# Patient Record
Sex: FEMALE | Race: ASIAN | NOT HISPANIC OR LATINO
[De-identification: names, ages, dates, MRNs, and addresses within clinical notes are randomized per-mention and may not be internally consistent; named-entity substitution may affect disease eponyms.]

---

## 2022-09-07 ENCOUNTER — ASOB RESULT (OUTPATIENT)
Age: 32
End: 2022-09-07

## 2022-09-07 ENCOUNTER — TRANSCRIPTION ENCOUNTER (OUTPATIENT)
Age: 32
End: 2022-09-07

## 2022-09-07 ENCOUNTER — APPOINTMENT (OUTPATIENT)
Dept: ANTEPARTUM | Facility: CLINIC | Age: 32
End: 2022-09-07

## 2022-09-07 PROBLEM — Z00.00 ENCOUNTER FOR PREVENTIVE HEALTH EXAMINATION: Status: ACTIVE | Noted: 2022-09-07

## 2022-09-07 PROCEDURE — 76817 TRANSVAGINAL US OBSTETRIC: CPT

## 2022-09-07 PROCEDURE — 76805 OB US >/= 14 WKS SNGL FETUS: CPT

## 2022-12-21 ENCOUNTER — NON-APPOINTMENT (OUTPATIENT)
Age: 32
End: 2022-12-21

## 2022-12-29 ENCOUNTER — APPOINTMENT (OUTPATIENT)
Dept: ANTEPARTUM | Facility: CLINIC | Age: 32
End: 2022-12-29
Payer: COMMERCIAL

## 2022-12-29 ENCOUNTER — ASOB RESULT (OUTPATIENT)
Age: 32
End: 2022-12-29

## 2022-12-29 PROCEDURE — 76816 OB US FOLLOW-UP PER FETUS: CPT

## 2022-12-29 PROCEDURE — 76819 FETAL BIOPHYS PROFIL W/O NST: CPT

## 2023-01-16 ENCOUNTER — TRANSCRIPTION ENCOUNTER (OUTPATIENT)
Age: 33
End: 2023-01-16

## 2023-01-16 ENCOUNTER — INPATIENT (INPATIENT)
Facility: HOSPITAL | Age: 33
LOS: 6 days | Discharge: HOME | End: 2023-01-23
Attending: OBSTETRICS & GYNECOLOGY | Admitting: OBSTETRICS & GYNECOLOGY
Payer: COMMERCIAL

## 2023-01-16 VITALS — HEIGHT: 61 IN | WEIGHT: 145.06 LBS

## 2023-01-16 LAB
BASOPHILS # BLD AUTO: 0.03 K/UL — SIGNIFICANT CHANGE UP (ref 0–0.2)
BASOPHILS NFR BLD AUTO: 0.4 % — SIGNIFICANT CHANGE UP (ref 0–2)
BLD GP AB SCN SERPL QL: NEGATIVE — SIGNIFICANT CHANGE UP
EOSINOPHIL # BLD AUTO: 0.07 K/UL — SIGNIFICANT CHANGE UP (ref 0–0.5)
EOSINOPHIL NFR BLD AUTO: 0.8 % — SIGNIFICANT CHANGE UP (ref 0–6)
HCT VFR BLD CALC: 35.5 % — SIGNIFICANT CHANGE UP (ref 34.5–45)
HGB BLD-MCNC: 11.7 G/DL — SIGNIFICANT CHANGE UP (ref 11.5–15.5)
IMM GRANULOCYTES NFR BLD AUTO: 0.5 % — SIGNIFICANT CHANGE UP (ref 0–0.9)
LYMPHOCYTES # BLD AUTO: 2.05 K/UL — SIGNIFICANT CHANGE UP (ref 1–3.3)
LYMPHOCYTES # BLD AUTO: 24.5 % — SIGNIFICANT CHANGE UP (ref 13–44)
MCHC RBC-ENTMCNC: 27.9 PG — SIGNIFICANT CHANGE UP (ref 27–34)
MCHC RBC-ENTMCNC: 33 GM/DL — SIGNIFICANT CHANGE UP (ref 32–36)
MCV RBC AUTO: 84.5 FL — SIGNIFICANT CHANGE UP (ref 80–100)
MONOCYTES # BLD AUTO: 0.69 K/UL — SIGNIFICANT CHANGE UP (ref 0–0.9)
MONOCYTES NFR BLD AUTO: 8.2 % — SIGNIFICANT CHANGE UP (ref 2–14)
NEUTROPHILS # BLD AUTO: 5.49 K/UL — SIGNIFICANT CHANGE UP (ref 1.8–7.4)
NEUTROPHILS NFR BLD AUTO: 65.6 % — SIGNIFICANT CHANGE UP (ref 43–77)
NRBC # BLD: 0 /100 WBCS — SIGNIFICANT CHANGE UP (ref 0–0)
PLATELET # BLD AUTO: 249 K/UL — SIGNIFICANT CHANGE UP (ref 150–400)
RBC # BLD: 4.2 M/UL — SIGNIFICANT CHANGE UP (ref 3.8–5.2)
RBC # FLD: 12.7 % — SIGNIFICANT CHANGE UP (ref 10.3–14.5)
RH IG SCN BLD-IMP: POSITIVE — SIGNIFICANT CHANGE UP
SARS-COV-2 RNA SPEC QL NAA+PROBE: NEGATIVE — SIGNIFICANT CHANGE UP
WBC # BLD: 8.37 K/UL — SIGNIFICANT CHANGE UP (ref 3.8–10.5)
WBC # FLD AUTO: 8.37 K/UL — SIGNIFICANT CHANGE UP (ref 3.8–10.5)

## 2023-01-16 RX ORDER — SODIUM CHLORIDE 9 MG/ML
1000 INJECTION, SOLUTION INTRAVENOUS
Refills: 0 | Status: DISCONTINUED | OUTPATIENT
Start: 2023-01-16 | End: 2023-01-17

## 2023-01-16 RX ORDER — OXYTOCIN 10 UNIT/ML
333.33 VIAL (ML) INJECTION
Qty: 20 | Refills: 0 | Status: DISCONTINUED | OUTPATIENT
Start: 2023-01-16 | End: 2023-01-17

## 2023-01-16 RX ORDER — OXYTOCIN 10 UNIT/ML
2 VIAL (ML) INJECTION
Qty: 30 | Refills: 0 | Status: DISCONTINUED | OUTPATIENT
Start: 2023-01-16 | End: 2023-01-17

## 2023-01-16 RX ORDER — CHLORHEXIDINE GLUCONATE 213 G/1000ML
1 SOLUTION TOPICAL ONCE
Refills: 0 | Status: DISCONTINUED | OUTPATIENT
Start: 2023-01-16 | End: 2023-01-17

## 2023-01-16 RX ORDER — CITRIC ACID/SODIUM CITRATE 300-500 MG
15 SOLUTION, ORAL ORAL EVERY 6 HOURS
Refills: 0 | Status: DISCONTINUED | OUTPATIENT
Start: 2023-01-16 | End: 2023-01-17

## 2023-01-17 LAB
ANISOCYTOSIS BLD QL: SLIGHT — SIGNIFICANT CHANGE UP
APPEARANCE UR: CLEAR — SIGNIFICANT CHANGE UP
APTT BLD: 31.9 SEC — SIGNIFICANT CHANGE UP (ref 27.5–35.5)
APTT BLD: 32.1 SEC — SIGNIFICANT CHANGE UP (ref 27.5–35.5)
BACTERIA # UR AUTO: SIGNIFICANT CHANGE UP /HPF
BASOPHILS # BLD AUTO: 0 K/UL — SIGNIFICANT CHANGE UP (ref 0–0.2)
BASOPHILS NFR BLD AUTO: 0 % — SIGNIFICANT CHANGE UP (ref 0–2)
BILIRUB UR-MCNC: NEGATIVE — SIGNIFICANT CHANGE UP
COLOR SPEC: YELLOW — SIGNIFICANT CHANGE UP
COVID-19 SPIKE DOMAIN AB INTERP: POSITIVE
COVID-19 SPIKE DOMAIN ANTIBODY RESULT: >250 U/ML — HIGH
DACRYOCYTES BLD QL SMEAR: SLIGHT — SIGNIFICANT CHANGE UP
DIFF PNL FLD: ABNORMAL
EOSINOPHIL # BLD AUTO: 0 K/UL — SIGNIFICANT CHANGE UP (ref 0–0.5)
EOSINOPHIL NFR BLD AUTO: 0 % — SIGNIFICANT CHANGE UP (ref 0–6)
EPI CELLS # UR: SIGNIFICANT CHANGE UP /HPF (ref 0–5)
FIBRINOGEN PPP-MCNC: 293 MG/DL — SIGNIFICANT CHANGE UP (ref 258–438)
GLUCOSE UR QL: 100
HCT VFR BLD CALC: 28.6 % — LOW (ref 34.5–45)
HCT VFR BLD CALC: 29.4 % — LOW (ref 34.5–45)
HGB BLD-MCNC: 9.4 G/DL — LOW (ref 11.5–15.5)
HGB BLD-MCNC: 9.6 G/DL — LOW (ref 11.5–15.5)
HYPOCHROMIA BLD QL: SLIGHT — SIGNIFICANT CHANGE UP
INR BLD: 1.05 — SIGNIFICANT CHANGE UP (ref 0.88–1.16)
INR BLD: 1.06 — SIGNIFICANT CHANGE UP (ref 0.88–1.16)
KETONES UR-MCNC: NEGATIVE — SIGNIFICANT CHANGE UP
LACTATE SERPL-SCNC: 1.7 MMOL/L — SIGNIFICANT CHANGE UP (ref 0.5–2)
LACTATE SERPL-SCNC: 3.6 MMOL/L — HIGH (ref 0.5–2)
LACTATE SERPL-SCNC: 5.8 MMOL/L — CRITICAL HIGH (ref 0.5–2)
LEUKOCYTE ESTERASE UR-ACNC: NEGATIVE — SIGNIFICANT CHANGE UP
LYMPHOCYTES # BLD AUTO: 0 % — LOW (ref 13–44)
LYMPHOCYTES # BLD AUTO: 0 K/UL — LOW (ref 1–3.3)
MANUAL SMEAR VERIFICATION: SIGNIFICANT CHANGE UP
MCHC RBC-ENTMCNC: 27.9 PG — SIGNIFICANT CHANGE UP (ref 27–34)
MCHC RBC-ENTMCNC: 28.1 PG — SIGNIFICANT CHANGE UP (ref 27–34)
MCHC RBC-ENTMCNC: 32.7 GM/DL — SIGNIFICANT CHANGE UP (ref 32–36)
MCHC RBC-ENTMCNC: 32.9 GM/DL — SIGNIFICANT CHANGE UP (ref 32–36)
MCV RBC AUTO: 84.9 FL — SIGNIFICANT CHANGE UP (ref 80–100)
MCV RBC AUTO: 86 FL — SIGNIFICANT CHANGE UP (ref 80–100)
MONOCYTES # BLD AUTO: 0.21 K/UL — SIGNIFICANT CHANGE UP (ref 0–0.9)
MONOCYTES NFR BLD AUTO: 0.9 % — LOW (ref 2–14)
NEUTROPHILS # BLD AUTO: 22.81 K/UL — HIGH (ref 1.8–7.4)
NEUTROPHILS NFR BLD AUTO: 83.5 % — HIGH (ref 43–77)
NEUTS BAND # BLD: 15.6 % — HIGH (ref 0–8)
NITRITE UR-MCNC: NEGATIVE — SIGNIFICANT CHANGE UP
NRBC # BLD: 0 /100 WBCS — SIGNIFICANT CHANGE UP (ref 0–0)
OVALOCYTES BLD QL SMEAR: SLIGHT — SIGNIFICANT CHANGE UP
PH UR: 6.5 — SIGNIFICANT CHANGE UP (ref 5–8)
PLAT MORPH BLD: ABNORMAL
PLATELET # BLD AUTO: 165 K/UL — SIGNIFICANT CHANGE UP (ref 150–400)
PLATELET # BLD AUTO: 170 K/UL — SIGNIFICANT CHANGE UP (ref 150–400)
POIKILOCYTOSIS BLD QL AUTO: SLIGHT — SIGNIFICANT CHANGE UP
POLYCHROMASIA BLD QL SMEAR: SLIGHT — SIGNIFICANT CHANGE UP
PROT UR-MCNC: NEGATIVE MG/DL — SIGNIFICANT CHANGE UP
PROTHROM AB SERPL-ACNC: 12.5 SEC — SIGNIFICANT CHANGE UP (ref 10.5–13.4)
PROTHROM AB SERPL-ACNC: 12.6 SEC — SIGNIFICANT CHANGE UP (ref 10.5–13.4)
RBC # BLD: 3.37 M/UL — LOW (ref 3.8–5.2)
RBC # BLD: 3.42 M/UL — LOW (ref 3.8–5.2)
RBC # FLD: 13.2 % — SIGNIFICANT CHANGE UP (ref 10.3–14.5)
RBC # FLD: 13.2 % — SIGNIFICANT CHANGE UP (ref 10.3–14.5)
RBC BLD AUTO: ABNORMAL
RBC CASTS # UR COMP ASSIST: ABNORMAL /HPF
RH IG SCN BLD-IMP: POSITIVE — SIGNIFICANT CHANGE UP
SARS-COV-2 IGG+IGM SERPL QL IA: >250 U/ML — HIGH
SARS-COV-2 IGG+IGM SERPL QL IA: POSITIVE
SP GR SPEC: 1.01 — SIGNIFICANT CHANGE UP (ref 1–1.03)
SPHEROCYTES BLD QL SMEAR: SLIGHT — SIGNIFICANT CHANGE UP
T PALLIDUM AB TITR SER: NEGATIVE — SIGNIFICANT CHANGE UP
UROBILINOGEN FLD QL: 0.2 E.U./DL — SIGNIFICANT CHANGE UP
WBC # BLD: 23.02 K/UL — HIGH (ref 3.8–10.5)
WBC # BLD: 25.98 K/UL — HIGH (ref 3.8–10.5)
WBC # FLD AUTO: 23.02 K/UL — HIGH (ref 3.8–10.5)
WBC # FLD AUTO: 25.98 K/UL — HIGH (ref 3.8–10.5)
WBC UR QL: ABNORMAL /HPF

## 2023-01-17 PROCEDURE — 36000 PLACE NEEDLE IN VEIN: CPT

## 2023-01-17 PROCEDURE — 99291 CRITICAL CARE FIRST HOUR: CPT | Mod: GC

## 2023-01-17 PROCEDURE — 88307 TISSUE EXAM BY PATHOLOGIST: CPT | Mod: 26

## 2023-01-17 RX ORDER — ACETAMINOPHEN 500 MG
1000 TABLET ORAL ONCE
Refills: 0 | Status: DISCONTINUED | OUTPATIENT
Start: 2023-01-17 | End: 2023-01-17

## 2023-01-17 RX ORDER — TETANUS TOXOID, REDUCED DIPHTHERIA TOXOID AND ACELLULAR PERTUSSIS VACCINE, ADSORBED 5; 2.5; 8; 8; 2.5 [IU]/.5ML; [IU]/.5ML; UG/.5ML; UG/.5ML; UG/.5ML
0.5 SUSPENSION INTRAMUSCULAR ONCE
Refills: 0 | Status: DISCONTINUED | OUTPATIENT
Start: 2023-01-17 | End: 2023-01-23

## 2023-01-17 RX ORDER — ACETAMINOPHEN 500 MG
975 TABLET ORAL
Refills: 0 | Status: DISCONTINUED | OUTPATIENT
Start: 2023-01-17 | End: 2023-01-17

## 2023-01-17 RX ORDER — AMPICILLIN TRIHYDRATE 250 MG
CAPSULE ORAL
Refills: 0 | Status: DISCONTINUED | OUTPATIENT
Start: 2023-01-17 | End: 2023-01-17

## 2023-01-17 RX ORDER — MAGNESIUM HYDROXIDE 400 MG/1
30 TABLET, CHEWABLE ORAL
Refills: 0 | Status: DISCONTINUED | OUTPATIENT
Start: 2023-01-17 | End: 2023-01-17

## 2023-01-17 RX ORDER — GENTAMICIN SULFATE 40 MG/ML
270 VIAL (ML) INJECTION ONCE
Refills: 0 | Status: COMPLETED | OUTPATIENT
Start: 2023-01-17 | End: 2023-01-17

## 2023-01-17 RX ORDER — SODIUM CHLORIDE 9 MG/ML
1000 INJECTION, SOLUTION INTRAVENOUS
Refills: 0 | Status: DISCONTINUED | OUTPATIENT
Start: 2023-01-17 | End: 2023-01-18

## 2023-01-17 RX ORDER — BENZOCAINE 10 %
1 GEL (GRAM) MUCOUS MEMBRANE EVERY 6 HOURS
Refills: 0 | Status: DISCONTINUED | OUTPATIENT
Start: 2023-01-17 | End: 2023-01-17

## 2023-01-17 RX ORDER — HYDROCORTISONE 1 %
1 OINTMENT (GRAM) TOPICAL EVERY 6 HOURS
Refills: 0 | Status: DISCONTINUED | OUTPATIENT
Start: 2023-01-17 | End: 2023-01-23

## 2023-01-17 RX ORDER — DIBUCAINE 1 %
1 OINTMENT (GRAM) RECTAL EVERY 6 HOURS
Refills: 0 | Status: DISCONTINUED | OUTPATIENT
Start: 2023-01-17 | End: 2023-01-23

## 2023-01-17 RX ORDER — LANOLIN
1 OINTMENT (GRAM) TOPICAL EVERY 6 HOURS
Refills: 0 | Status: DISCONTINUED | OUTPATIENT
Start: 2023-01-17 | End: 2023-01-23

## 2023-01-17 RX ORDER — AMPICILLIN TRIHYDRATE 250 MG
2 CAPSULE ORAL ONCE
Refills: 0 | Status: COMPLETED | OUTPATIENT
Start: 2023-01-17 | End: 2023-01-17

## 2023-01-17 RX ORDER — PIPERACILLIN AND TAZOBACTAM 4; .5 G/20ML; G/20ML
4.5 INJECTION, POWDER, LYOPHILIZED, FOR SOLUTION INTRAVENOUS EVERY 8 HOURS
Refills: 0 | Status: DISCONTINUED | OUTPATIENT
Start: 2023-01-18 | End: 2023-01-23

## 2023-01-17 RX ORDER — AMPICILLIN TRIHYDRATE 250 MG
2 CAPSULE ORAL EVERY 6 HOURS
Refills: 0 | Status: DISCONTINUED | OUTPATIENT
Start: 2023-01-17 | End: 2023-01-17

## 2023-01-17 RX ORDER — PIPERACILLIN AND TAZOBACTAM 4; .5 G/20ML; G/20ML
4.5 INJECTION, POWDER, LYOPHILIZED, FOR SOLUTION INTRAVENOUS ONCE
Refills: 0 | Status: COMPLETED | OUTPATIENT
Start: 2023-01-17 | End: 2023-01-17

## 2023-01-17 RX ORDER — DIPHENHYDRAMINE HCL 50 MG
25 CAPSULE ORAL EVERY 6 HOURS
Refills: 0 | Status: DISCONTINUED | OUTPATIENT
Start: 2023-01-17 | End: 2023-01-17

## 2023-01-17 RX ORDER — SODIUM CHLORIDE 9 MG/ML
3 INJECTION INTRAMUSCULAR; INTRAVENOUS; SUBCUTANEOUS EVERY 8 HOURS
Refills: 0 | Status: DISCONTINUED | OUTPATIENT
Start: 2023-01-17 | End: 2023-01-23

## 2023-01-17 RX ORDER — GENTAMICIN SULFATE 40 MG/ML
270 VIAL (ML) INJECTION ONCE
Refills: 0 | Status: DISCONTINUED | OUTPATIENT
Start: 2023-01-17 | End: 2023-01-17

## 2023-01-17 RX ORDER — KETOROLAC TROMETHAMINE 30 MG/ML
30 SYRINGE (ML) INJECTION ONCE
Refills: 0 | Status: DISCONTINUED | OUTPATIENT
Start: 2023-01-17 | End: 2023-01-17

## 2023-01-17 RX ORDER — NOREPINEPHRINE BITARTRATE/D5W 8 MG/250ML
0.05 PLASTIC BAG, INJECTION (ML) INTRAVENOUS
Qty: 8 | Refills: 0 | Status: DISCONTINUED | OUTPATIENT
Start: 2023-01-17 | End: 2023-01-20

## 2023-01-17 RX ORDER — SIMETHICONE 80 MG/1
80 TABLET, CHEWABLE ORAL EVERY 4 HOURS
Refills: 0 | Status: DISCONTINUED | OUTPATIENT
Start: 2023-01-17 | End: 2023-01-23

## 2023-01-17 RX ORDER — IBUPROFEN 200 MG
600 TABLET ORAL EVERY 6 HOURS
Refills: 0 | Status: DISCONTINUED | OUTPATIENT
Start: 2023-01-17 | End: 2023-01-17

## 2023-01-17 RX ORDER — PRAMOXINE HYDROCHLORIDE 150 MG/15G
1 AEROSOL, FOAM RECTAL EVERY 4 HOURS
Refills: 0 | Status: DISCONTINUED | OUTPATIENT
Start: 2023-01-17 | End: 2023-01-23

## 2023-01-17 RX ORDER — OXYCODONE HYDROCHLORIDE 5 MG/1
5 TABLET ORAL ONCE
Refills: 0 | Status: DISCONTINUED | OUTPATIENT
Start: 2023-01-17 | End: 2023-01-19

## 2023-01-17 RX ORDER — ENOXAPARIN SODIUM 100 MG/ML
40 INJECTION SUBCUTANEOUS EVERY 24 HOURS
Refills: 0 | Status: DISCONTINUED | OUTPATIENT
Start: 2023-01-17 | End: 2023-01-23

## 2023-01-17 RX ORDER — OXYCODONE HYDROCHLORIDE 5 MG/1
5 TABLET ORAL
Refills: 0 | Status: DISCONTINUED | OUTPATIENT
Start: 2023-01-17 | End: 2023-01-19

## 2023-01-17 RX ORDER — OXYTOCIN 10 UNIT/ML
1 VIAL (ML) INJECTION
Qty: 30 | Refills: 0 | Status: DISCONTINUED | OUTPATIENT
Start: 2023-01-17 | End: 2023-01-17

## 2023-01-17 RX ORDER — OXYTOCIN 10 UNIT/ML
41.67 VIAL (ML) INJECTION
Qty: 20 | Refills: 0 | Status: DISCONTINUED | OUTPATIENT
Start: 2023-01-17 | End: 2023-01-17

## 2023-01-17 RX ORDER — AER TRAVELER 0.5 G/1
1 SOLUTION RECTAL; TOPICAL EVERY 4 HOURS
Refills: 0 | Status: DISCONTINUED | OUTPATIENT
Start: 2023-01-17 | End: 2023-01-23

## 2023-01-17 RX ADMIN — SODIUM CHLORIDE 3 MILLILITER(S): 9 INJECTION INTRAMUSCULAR; INTRAVENOUS; SUBCUTANEOUS at 21:14

## 2023-01-17 RX ADMIN — SODIUM CHLORIDE 125 MILLILITER(S): 9 INJECTION, SOLUTION INTRAVENOUS at 05:25

## 2023-01-17 RX ADMIN — Medication 100 MILLIGRAM(S): at 22:40

## 2023-01-17 RX ADMIN — Medication 216 GRAM(S): at 17:52

## 2023-01-17 RX ADMIN — Medication 100 MILLIGRAM(S): at 17:04

## 2023-01-17 RX ADMIN — Medication 1 MILLIUNIT(S)/MIN: at 06:38

## 2023-01-17 RX ADMIN — SODIUM CHLORIDE 1000 MILLILITER(S): 9 INJECTION, SOLUTION INTRAVENOUS at 19:47

## 2023-01-17 RX ADMIN — Medication 100 MILLIGRAM(S): at 12:11

## 2023-01-17 RX ADMIN — SODIUM CHLORIDE 125 MILLILITER(S): 9 INJECTION, SOLUTION INTRAVENOUS at 10:22

## 2023-01-17 RX ADMIN — PIPERACILLIN AND TAZOBACTAM 25 GRAM(S): 4; .5 INJECTION, POWDER, LYOPHILIZED, FOR SOLUTION INTRAVENOUS at 23:24

## 2023-01-17 RX ADMIN — Medication 2 MILLIUNIT(S)/MIN: at 00:33

## 2023-01-17 RX ADMIN — Medication 1000 MILLIGRAM(S): at 14:46

## 2023-01-17 RX ADMIN — PIPERACILLIN AND TAZOBACTAM 200 GRAM(S): 4; .5 INJECTION, POWDER, LYOPHILIZED, FOR SOLUTION INTRAVENOUS at 19:16

## 2023-01-17 RX ADMIN — Medication 6.17 MICROGRAM(S)/KG/MIN: at 18:50

## 2023-01-17 RX ADMIN — Medication 400 MILLIGRAM(S): at 12:06

## 2023-01-17 RX ADMIN — Medication 216 GRAM(S): at 11:45

## 2023-01-17 RX ADMIN — Medication 30 MILLIGRAM(S): at 16:37

## 2023-01-17 RX ADMIN — Medication 30 MILLIGRAM(S): at 14:50

## 2023-01-17 RX ADMIN — SODIUM CHLORIDE 125 MILLILITER(S): 9 INJECTION, SOLUTION INTRAVENOUS at 02:15

## 2023-01-17 NOTE — PROVIDER CONTACT NOTE (CHANGE IN STATUS NOTIFICATION) - SITUATION
Lactate resulted 5.8 critical value. OB resident Desiree and Yifan ALLAN anesthesia notified. Ilia SCHRADER ANM present. Anesthesia Suggests escalate level of care. Attending OB Vance called. Desiree ALLAN spoke with Vance at 1742 to notify of elevated lactate and to suggest escalation to higher level of care.

## 2023-01-17 NOTE — CHART NOTE - NSCHARTNOTEFT_GEN_A_CORE
Examined patient at bedside for nursing concern for hypotension - 70s/30s lynette, now 80/54. Pt asymptomatic with HR 99 at the time. Temperature 101.3 F. BPs notably 90s/50s during labor.  Bleeding minimal with firm, midline fundus. Gentle vaginal/perineal exam without evidence of hematoma. Pt asymptomatic, feels well. Anesthesia at bedside, recommended bolus and close monitoring. Grewal replaced, will monitor hourly urine output and monitor BPs closely. Continue IV A/G. Dr. Woodard aware Examined patient at bedside for nursing concern for hypotension - 70s/30s lynette, now 80/54. Pt asymptomatic with HR 99 at the time. Temperature 101.3 F. BPs notably 90s/50s during labor.  Bleeding minimal with firm, midline fundus. Gentle vaginal/perineal exam without evidence of hematoma. Pt asymptomatic, feels well. Anesthesia at bedside, recommended bolus and close monitoring. Grewal replaced, will monitor hourly urine output and monitor BPs closely. Continue IV A/G. Dr. Woodard aware    Addendum: urine output over the last hour 350cc. Pt sitting up in bed, appears clinically well, eating soup. Continues to be asymptomatic. Bleeding light, fundus firm. BP 87/52 . Reassuring clinical status. Will continue to monitor on L&D Examined patient at bedside for nursing concern for hypotension - 70s/30s lynette, now 80/54. Pt asymptomatic with HR 99 at the time. Temperature 101.3 F. BPs notably 90s/50s during labor.  Bleeding minimal with firm, midline fundus. Gentle vaginal/perineal exam without evidence of hematoma. Pt asymptomatic, feels well. Anesthesia at bedside, recommended bolus and close monitoring. Grewal replaced, will monitor hourly urine output and monitor BPs closely. Continue IV A/G. Dr. Woodard aware    Addendum: urine output over the last hour 350cc. Pt sitting up in bed, appears clinically well, eating soup. Continues to be asymptomatic. Bleeding light, fundus firm. BP 87/52 . Reassuring clinical status. Will continue to monitor on L&D    Addendum: patient examined again at bedside for Examined patient at bedside for nursing concern for hypotension - 70s/30s lynette, now 80/54. Pt asymptomatic with HR 99 at the time. Temperature 101.3 F. BPs notably 90s/50s during labor.  Bleeding minimal with firm, midline fundus. Gentle vaginal/perineal exam without evidence of hematoma. Pt asymptomatic, feels well. Anesthesia at bedside, recommended bolus and close monitoring. Grewal replaced, will monitor hourly urine output and monitor BPs closely. Continue IV A/G. Dr. Woodard aware    Addendum: urine output over the last hour 350cc. Pt sitting up in bed, appears clinically well, eating soup. Continues to be asymptomatic. Bleeding light, fundus firm. BP 87/52 . Reassuring clinical status. Will continue to monitor on L&D    Addendum: patient examined again at bedside for hypotensive BP of 70/39, . Pt continues to appear clinically well - conversational, mentating well, pain well controlled. Pt is now s/p 1L bolus with 250cc of urine output over the last hour. Anesthesia at bedside, as pt vo Examined patient at bedside for nursing concern for hypotension - 70s/30s lynette, now 80/54. Pt asymptomatic with HR 99 at the time. Temperature 101.3 F. BPs notably 90s/50s during labor.  Bleeding minimal with firm, midline fundus. Gentle vaginal/perineal exam without evidence of hematoma. Pt asymptomatic, feels well. Anesthesia at bedside, recommended bolus and close monitoring. Grewal replaced, will monitor hourly urine output and monitor BPs closely. Continue IV A/G. Dr. Woodard aware    Addendum: urine output over the last hour 350cc. Pt sitting up in bed, appears clinically well, eating soup. Continues to be asymptomatic. Bleeding light, fundus firm. BP 87/52 . Febrile to 101. Reassuring clinical status. Will continue to monitor on L&D    Addendum: patient examined again at bedside for hypotensive BP of 70/39, . Pt continues to appear clinically well - conversational, mentating well, pain well controlled. Pt is now s/p 1L bolus with 250cc of urine output over the last hour. Modified FAST exam performed, no free fluid noted, thin uterine stripe visualized. Recto-vaginal exam performed, no collection palpated. STAT CBC/coags drawn.     Addendum: Anesthesia at bedside, manual BP taken at high 60s/30s. HR 99, temp 99.3. Previously drawn Hb 9.4 from 11.7 starting with fibrinogen 293. OB STAT called - second IV placed, full labs redrawn including lactate and blood cultures. Maintenance fluids started. Pt continued to look clinically well with excellent urine output. Dr. Woodard and group en route to hospital. Plan to consult MICU to escalate to higher level of care. Dr. Grant () at bedside.     MICU consulted - recommend transfer to the MICU at this time for concern for septic shock. WBC 26 with bandemia, lactate 5.8. 2nd liter of LR bolus started prior to transfer. Dr. Londono on call for group and in agreement with escalation to higher level of care. Examined patient at bedside for nursing concern for hypotension - 70s/30s lynette, now 80/54. Pt asymptomatic with HR 99 at the time. Temperature 101.3 F. BPs notably 90s/50s during labor. s/p IV fist dose A/G for intrapartum chorio. Bleeding minimal with firm, midline fundus. Gentle vaginal/perineal exam without evidence of hematoma. Pt asymptomatic, feels well. Anesthesia at bedside, recommended bolus and close monitoring. Grewal replaced, will monitor hourly urine output and monitor BPs closely. Continue IV A/G. Dr. Woodard aware    Addendum: urine output over the last hour 350cc. Pt sitting up in bed, appears clinically well, eating soup. Continues to be asymptomatic. Bleeding light, fundus firm. BP 87/52 . Febrile to 101. Reassuring clinical status. Will continue to monitor on L&D    Addendum: patient examined again at bedside for hypotensive BP of 70/39, . Pt continues to appear clinically well - conversational, mentating well, pain well controlled. Pt is now s/p 1L bolus with 250cc of urine output over the last hour. Modified FAST exam performed, no free fluid noted, thin uterine stripe visualized. Recto-vaginal exam performed, no collection palpated. STAT CBC/coags drawn.     Addendum: Anesthesia at bedside, manual BP taken at high 60s/30s. HR 99, temp 99.3. Previously drawn Hb 9.4 from 11.7 starting with fibrinogen 293. OB STAT called - second IV placed, full labs redrawn including lactate and blood cultures. Maintenance fluids started. Addition of clindamycin to her standing ampicillin/gentamicin for chorioamnionitis. Pt continued to look clinically well with excellent urine output. Dr. Woodard and group en route to hospital. Plan to consult MICU to escalate to higher level of care. Dr. Grant () at bedside.     MICU consulted - recommend transfer to the MICU at this time for concern for septic shock. WBC 26 with bandemia, lactate 5.8. 2nd liter of LR bolus started prior to transfer. Dr. Londono on call for group and in agreement with escalation to higher level of care.

## 2023-01-17 NOTE — PACU DISCHARGE NOTE - COMMENTS
Significant hypotension noted beginning approximately 30 minutes after patient handed over to PACU/room nurse. Range of blood pressures was 70's/40's. Good urine output with over 250 cc of clear yellow urine noted in immediate post-operative period. Patient mentating well and asymptomatic. Blood pressure was fluid responsive to total of 2.5 L Lactated Ringers and phenylephrine 100 mcg pushes but would not consistently improve. ASHUTOSH of 60's over 30's. Escalation of care after discussion with primary service and obstetrics . Second IV started, full labs redrawn with blood cultures. Clindamycin added to ampicillin/gentamicin for suspicion of early sepsis due to chorioamnionitis. Lactate 5.8. WBC 26 with bandemia. MICU consulted for escalation of care with successful transfer and stabilization.

## 2023-01-17 NOTE — CONSULT NOTE ADULT - SUBJECTIVE AND OBJECTIVE BOX
******** ICU CONSULT NOTE **********    Patient is a 32y old  Female who presents with a chief complaint of     Consult reason:  ED vitals: T   HR   RR  BP  SpO2  Labs signficant:  Imaging/EKG:   Interventions:    HPI:      Allergies    No Known Allergies    Intolerances      Home Medications:      SOCIAL HX:     Smoking          ETOH/Illicit drugs          Occupation    PAST MEDICAL & SURGICAL HISTORY:      FAMILY HISTORY:  No known cardiovascular or pulmonary family history       VITAL SIGNS:  T(C): 38 (01-17-23 @ 15:45), Max: 38.5 (01-17-23 @ 14:15)  T(F): 100.4 (01-17-23 @ 15:45), Max: 101.3 (01-17-23 @ 14:15)  HR: 100 (01-17-23 @ 15:45) (96 - 125)  BP: 81/40 (01-17-23 @ 15:45) (76/40 - 96/46)  RR: 16 (01-17-23 @ 15:45) (16 - 16)  SpO2: 98% (01-17-23 @ 15:45) (98% - 100%)    PHYSICAL EXAM:  Constitutional: Resting comfortably in bed; NAD  Head: NC/AT  Eyes: PERRL, EOMI, clear conjunctiva  ENT: no nasal discharge; uvula midline, no oropharyngeal erythema or exudates; MMM  Neck: supple; no JVD or thyromegaly  Respiratory: CTA B/L; no W/R/R, no retractions  Cardiac: +S1/S2; RRR; no M/R/G;  Gastrointestinal: soft, NT/ND; no rebound or guarding; +BSx4  Extremities: WWP, no clubbing or cyanosis; no peripheral edema  Musculoskeletal: NROM x4; no joint swelling, tenderness or erythema  Vascular: 2+ radial, femoral, DP/PT pulses B/L  Dermatologic: skin warm, dry and intact; no rashes, wounds, or scars  Neurologic: AAOx3; CNII-XII grossly intact; no focal deficits  Psychiatric: affect and characteristics of appearance, verbalizations, behaviors are appropriate      01-17-23 @ 07:01  -  01-17-23 @ 18:17  --------------------------------------------------------  IN:    Lactated Ringers: 3000 mL    Oxytocin: 1000 mL  Total IN: 4000 mL    OUT:    Blood Loss (mL): 300 mL    Indwelling Catheter - Urethral (mL): 2010 mL  Total OUT: 2310 mL    Total NET: 1690 mL          LABS:                          9.6    25.98 )-----------( 170      ( 17 Jan 2023 17:01 )             29.4                                                       PT/INR - ( 17 Jan 2023 17:01 )   PT: 12.5 sec;   INR: 1.05          PTT - ( 17 Jan 2023 17:01 )  PTT:32.1 sec                                             MEDICATIONS  (STANDING):  acetaminophen     Tablet .. 975 milliGRAM(s) Oral <User Schedule>  ampicillin  IVPB 2 Gram(s) IV Intermittent every 6 hours  clindamycin IVPB 900 milliGRAM(s) IV Intermittent every 8 hours  diphtheria/tetanus/pertussis (acellular) Vaccine (Adacel) 0.5 milliLiter(s) IntraMuscular once  ibuprofen  Tablet. 600 milliGRAM(s) Oral every 6 hours  oxytocin Infusion 333.333 milliUNIT(s)/Min (1000 mL/Hr) IV Continuous <Continuous>  oxytocin Infusion 41.667 milliUNIT(s)/Min (125 mL/Hr) IV Continuous <Continuous>  prenatal multivitamin 1 Tablet(s) Oral daily  sodium chloride 0.9% lock flush 3 milliLiter(s) IV Push every 8 hours    MEDICATIONS  (PRN):  benzocaine 20%/menthol 0.5% Spray 1 Spray(s) Topical every 6 hours PRN for Perineal discomfort  dibucaine 1% Ointment 1 Application(s) Topical every 6 hours PRN Perineal discomfort  diphenhydrAMINE 25 milliGRAM(s) Oral every 6 hours PRN Pruritus  hydrocortisone 1% Cream 1 Application(s) Topical every 6 hours PRN Moderate Pain (4-6)  lanolin Ointment 1 Application(s) Topical every 6 hours PRN nipple soreness  magnesium hydroxide Suspension 30 milliLiter(s) Oral two times a day PRN Constipation  oxyCODONE    IR 5 milliGRAM(s) Oral every 3 hours PRN Moderate to Severe Pain (4-10)  oxyCODONE    IR 5 milliGRAM(s) Oral once PRN Moderate to Severe Pain (4-10)  pramoxine 1%/zinc 5% Cream 1 Application(s) Topical every 4 hours PRN Moderate Pain (4-6)  simethicone 80 milliGRAM(s) Chew every 4 hours PRN Gas  witch hazel Pads 1 Application(s) Topical every 4 hours PRN Perineal discomfort           ******** ICU CONSULT NOTE **********    33 yo F with PMHx   px to [facility] on 1/16       Allergies:   No Known Allergies        FAMILY HISTORY:  No known cardiovascular or pulmonary family history       VITAL SIGNS:  T(C): 38 (01-17-23 @ 15:45), Max: 38.5 (01-17-23 @ 14:15)  T(F): 100.4 (01-17-23 @ 15:45), Max: 101.3 (01-17-23 @ 14:15)  HR: 100 (01-17-23 @ 15:45) (96 - 125)  BP: 81/40 (01-17-23 @ 15:45) (76/40 - 96/46)  RR: 16 (01-17-23 @ 15:45) (16 - 16)  SpO2: 98% (01-17-23 @ 15:45) (98% - 100%)    PHYSICAL EXAM:  Constitutional: Resting comfortably in bed; NAD  Head: NC/AT  Eyes: PERRL, EOMI, clear conjunctiva  ENT: no nasal discharge; uvula midline, no oropharyngeal erythema or exudates; MMM  Neck: supple; no JVD or thyromegaly  Respiratory: CTA B/L; no W/R/R, no retractions  Cardiac: +S1/S2; RRR; no M/R/G;  Gastrointestinal: soft, NT/ND; no rebound or guarding; +BSx4  Extremities: WWP, no clubbing or cyanosis; no peripheral edema  Musculoskeletal: NROM x4; no joint swelling, tenderness or erythema  Vascular: 2+ radial, femoral, DP/PT pulses B/L  Dermatologic: skin warm, dry and intact; no rashes, wounds, or scars  Neurologic: AAOx3; CNII-XII grossly intact; no focal deficits  Psychiatric: affect and characteristics of appearance, verbalizations, behaviors are appropriate      01-17-23 @ 07:01  -  01-17-23 @ 18:17  --------------------------------------------------------  IN:    Lactated Ringers: 3000 mL    Oxytocin: 1000 mL  Total IN: 4000 mL    OUT:    Blood Loss (mL): 300 mL    Indwelling Catheter - Urethral (mL): 2010 mL  Total OUT: 2310 mL    Total NET: 1690 mL          LABS:                          9.6    25.98 )-----------( 170      ( 17 Jan 2023 17:01 )             29.4                                                       PT/INR - ( 17 Jan 2023 17:01 )   PT: 12.5 sec;   INR: 1.05          PTT - ( 17 Jan 2023 17:01 )  PTT:32.1 sec                                             MEDICATIONS  (STANDING):  acetaminophen     Tablet .. 975 milliGRAM(s) Oral <User Schedule>  ampicillin  IVPB 2 Gram(s) IV Intermittent every 6 hours  clindamycin IVPB 900 milliGRAM(s) IV Intermittent every 8 hours  diphtheria/tetanus/pertussis (acellular) Vaccine (Adacel) 0.5 milliLiter(s) IntraMuscular once  ibuprofen  Tablet. 600 milliGRAM(s) Oral every 6 hours  oxytocin Infusion 333.333 milliUNIT(s)/Min (1000 mL/Hr) IV Continuous <Continuous>  oxytocin Infusion 41.667 milliUNIT(s)/Min (125 mL/Hr) IV Continuous <Continuous>  prenatal multivitamin 1 Tablet(s) Oral daily  sodium chloride 0.9% lock flush 3 milliLiter(s) IV Push every 8 hours    MEDICATIONS  (PRN):  benzocaine 20%/menthol 0.5% Spray 1 Spray(s) Topical every 6 hours PRN for Perineal discomfort  dibucaine 1% Ointment 1 Application(s) Topical every 6 hours PRN Perineal discomfort  diphenhydrAMINE 25 milliGRAM(s) Oral every 6 hours PRN Pruritus  hydrocortisone 1% Cream 1 Application(s) Topical every 6 hours PRN Moderate Pain (4-6)  lanolin Ointment 1 Application(s) Topical every 6 hours PRN nipple soreness  magnesium hydroxide Suspension 30 milliLiter(s) Oral two times a day PRN Constipation  oxyCODONE    IR 5 milliGRAM(s) Oral every 3 hours PRN Moderate to Severe Pain (4-10)  oxyCODONE    IR 5 milliGRAM(s) Oral once PRN Moderate to Severe Pain (4-10)  pramoxine 1%/zinc 5% Cream 1 Application(s) Topical every 4 hours PRN Moderate Pain (4-6)  simethicone 80 milliGRAM(s) Chew every 4 hours PRN Gas  witch hazel Pads 1 Application(s) Topical every 4 hours PRN Perineal discomfort           ******** ICU CONSULT NOTE **********    33 yo F with no known PMHx presents to [facility] ED on 1/16. Per OB team, patient presented at full term w/ concern for chorioamnionitis. Pt was started on Gentamycin + ampicillin by OB team. Pt had vaginal forceps-assisted delivery (w/ epidural) around 11am on 01/17 and underwent minimal blood loss of about 300cc during delivery per OB team. Post-delivery, patient noted to be hypotensive to 80s/50s with proper mentation. Per OB team, patient antibiotic regimen broadened to include Gentamycin. Pt given 2L NS bolus and kept on maintenance 125cc/hr with BPs remaining in 80s/50s range. ICU team consulted for further management of concern for septic shock.    Allergies:   No Known Allergies      FAMILY HISTORY:  No known cardiovascular or pulmonary family history       VITAL SIGNS:  T(C): 38 (01-17-23 @ 15:45), Max: 38.5 (01-17-23 @ 14:15)  T(F): 100.4 (01-17-23 @ 15:45), Max: 101.3 (01-17-23 @ 14:15)  HR: 100 (01-17-23 @ 15:45) (96 - 125)  BP: 81/40 (01-17-23 @ 15:45) (76/40 - 96/46)  RR: 16 (01-17-23 @ 15:45) (16 - 16)  SpO2: 98% (01-17-23 @ 15:45) (98% - 100%)    PHYSICAL EXAM:  Constitutional: laying in bed; no respiratory distress; speaking in full sentences   Eyes: PERRL, EOMI, clear conjunctiva  ENT: no nasal discharge; no oropharyngeal erythema or exudates; MMM  Neck: supple  Respiratory: CTA B/L; no W/R/R  Cardiac: +S1/S2; RRR; no M/R/G  Gastrointestinal: soft, NT/ND; no rebound or guarding; +BSx4  Extremities: WWP, no clubbing or cyanosis; non-pitting LE edema b/l   Musculoskeletal: NROM x4; no joint swelling, tenderness or erythema  Vascular: 2+ radial, femoral, DP/PT pulses B/L  Dermatologic: skin warm, dry and intact; no rashes, wounds, or scars  Neurologic: AAOx3; no focal deficits  Psychiatric: affect and characteristics of appearance, verbalizations, behaviors are appropriate      01-17-23 @ 07:01  -  01-17-23 @ 18:17  --------------------------------------------------------  IN:    Lactated Ringers: 3000 mL    Oxytocin: 1000 mL  Total IN: 4000 mL    OUT:    Blood Loss (mL): 300 mL    Indwelling Catheter - Urethral (mL): 2010 mL  Total OUT: 2310 mL    Total NET: 1690 mL    LABS:                        9.6    25.98 )-----------( 170      ( 17 Jan 2023 17:01 )             29.4                                                       PT/INR - ( 17 Jan 2023 17:01 )   PT: 12.5 sec;   INR: 1.05          PTT - ( 17 Jan 2023 17:01 )  PTT:32.1 sec                                           MEDICATIONS  (STANDING):  acetaminophen     Tablet .. 975 milliGRAM(s) Oral <User Schedule>  ampicillin  IVPB 2 Gram(s) IV Intermittent every 6 hours  clindamycin IVPB 900 milliGRAM(s) IV Intermittent every 8 hours  diphtheria/tetanus/pertussis (acellular) Vaccine (Adacel) 0.5 milliLiter(s) IntraMuscular once  ibuprofen  Tablet. 600 milliGRAM(s) Oral every 6 hours  oxytocin Infusion 333.333 milliUNIT(s)/Min (1000 mL/Hr) IV Continuous <Continuous>  oxytocin Infusion 41.667 milliUNIT(s)/Min (125 mL/Hr) IV Continuous <Continuous>  prenatal multivitamin 1 Tablet(s) Oral daily  sodium chloride 0.9% lock flush 3 milliLiter(s) IV Push every 8 hours    MEDICATIONS  (PRN):  benzocaine 20%/menthol 0.5% Spray 1 Spray(s) Topical every 6 hours PRN for Perineal discomfort  dibucaine 1% Ointment 1 Application(s) Topical every 6 hours PRN Perineal discomfort  diphenhydrAMINE 25 milliGRAM(s) Oral every 6 hours PRN Pruritus  hydrocortisone 1% Cream 1 Application(s) Topical every 6 hours PRN Moderate Pain (4-6)  lanolin Ointment 1 Application(s) Topical every 6 hours PRN nipple soreness  magnesium hydroxide Suspension 30 milliLiter(s) Oral two times a day PRN Constipation  oxyCODONE    IR 5 milliGRAM(s) Oral every 3 hours PRN Moderate to Severe Pain (4-10)  oxyCODONE    IR 5 milliGRAM(s) Oral once PRN Moderate to Severe Pain (4-10)  pramoxine 1%/zinc 5% Cream 1 Application(s) Topical every 4 hours PRN Moderate Pain (4-6)  simethicone 80 milliGRAM(s) Chew every 4 hours PRN Gas  witch hazel Pads 1 Application(s) Topical every 4 hours PRN Perineal discomfort           ******** ICU CONSULT NOTE **********    33 yo F with no known PMHx presents to [facility] ED on 1/16. Per OB team, patient presented at full term (1st pregnancy) w/ concern for chorioamnionitis. Pt was started on Gentamycin + ampicillin by OB team. Pt had vaginal forceps-assisted delivery (w/ epidural) around 11am on 01/17 and underwent minimal blood loss of about 300cc during delivery per OB team. Post-delivery, patient noted to be hypotensive to 80s/50s with proper mentation. Per OB team, patient antibiotic regimen broadened to include Gentamycin. Pt given 2L NS bolus and kept on maintenance 125cc/hr with BPs remaining in 80s/50s range. ICU team consulted for further management of concern for septic shock.    ICU consult team arrived at bedside. VS notable for HR 80s, BP 80-90/60s, satting well on RA. Pt denying all ROS (no HA, dizziness, lightheadedness, chest pain, abd pain). Feels overall well s/p delivery of healthy baby girl. Given persistent hypotension, known febrile rectal temp, concern for septic shock.     Allergies:   No Known Allergies    FAMILY HISTORY:  No known cardiovascular or pulmonary family history       VITAL SIGNS:  T(C): 38 (01-17-23 @ 15:45), Max: 38.5 (01-17-23 @ 14:15)  T(F): 100.4 (01-17-23 @ 15:45), Max: 101.3 (01-17-23 @ 14:15)  HR: 100 (01-17-23 @ 15:45) (96 - 125)  BP: 81/40 (01-17-23 @ 15:45) (76/40 - 96/46)  RR: 16 (01-17-23 @ 15:45) (16 - 16)  SpO2: 98% (01-17-23 @ 15:45) (98% - 100%)    PHYSICAL EXAM:  Constitutional: Laying in OB bed, appears comfortable in NAD.  Eyes: clear conjunctiva  ENT:  MMM  Neck: supple  Respiratory: CTA B/L; no W/R/R  Cardiac: +S1/S2; RRR; flow murmur audible (mostly in 2nd L/R intercostal spaces)  Gastrointestinal: soft, NT/ND; no rebound or guarding; +BSx4  Extremities: WWP, non-pitting edema of lower extremities.  Musculoskeletal: NROM x4;  Vascular: 2+ radial, DP/PT pulses B/L  Dermatologic: skin warm, dry and intact; no rashes, wounds, or scars  Neurologic: AAOx3; no focal deficits      01-17-23 @ 07:01  -  01-17-23 @ 18:17  --------------------------------------------------------  IN:    Lactated Ringers: 3000 mL    Oxytocin: 1000 mL  Total IN: 4000 mL    OUT:    Blood Loss (mL): 300 mL    Indwelling Catheter - Urethral (mL): 2010 mL  Total OUT: 2310 mL    Total NET: 1690 mL    LABS:                        9.6    25.98 )-----------( 170      ( 17 Jan 2023 17:01 )             29.4                                                       PT/INR - ( 17 Jan 2023 17:01 )   PT: 12.5 sec;   INR: 1.05          PTT - ( 17 Jan 2023 17:01 )  PTT:32.1 sec                                           MEDICATIONS  (STANDING):  acetaminophen     Tablet .. 975 milliGRAM(s) Oral <User Schedule>  ampicillin  IVPB 2 Gram(s) IV Intermittent every 6 hours  clindamycin IVPB 900 milliGRAM(s) IV Intermittent every 8 hours  diphtheria/tetanus/pertussis (acellular) Vaccine (Adacel) 0.5 milliLiter(s) IntraMuscular once  ibuprofen  Tablet. 600 milliGRAM(s) Oral every 6 hours  oxytocin Infusion 333.333 milliUNIT(s)/Min (1000 mL/Hr) IV Continuous <Continuous>  oxytocin Infusion 41.667 milliUNIT(s)/Min (125 mL/Hr) IV Continuous <Continuous>  prenatal multivitamin 1 Tablet(s) Oral daily  sodium chloride 0.9% lock flush 3 milliLiter(s) IV Push every 8 hours    MEDICATIONS  (PRN):  benzocaine 20%/menthol 0.5% Spray 1 Spray(s) Topical every 6 hours PRN for Perineal discomfort  dibucaine 1% Ointment 1 Application(s) Topical every 6 hours PRN Perineal discomfort  diphenhydrAMINE 25 milliGRAM(s) Oral every 6 hours PRN Pruritus  hydrocortisone 1% Cream 1 Application(s) Topical every 6 hours PRN Moderate Pain (4-6)  lanolin Ointment 1 Application(s) Topical every 6 hours PRN nipple soreness  magnesium hydroxide Suspension 30 milliLiter(s) Oral two times a day PRN Constipation  oxyCODONE    IR 5 milliGRAM(s) Oral every 3 hours PRN Moderate to Severe Pain (4-10)  oxyCODONE    IR 5 milliGRAM(s) Oral once PRN Moderate to Severe Pain (4-10)  pramoxine 1%/zinc 5% Cream 1 Application(s) Topical every 4 hours PRN Moderate Pain (4-6)  simethicone 80 milliGRAM(s) Chew every 4 hours PRN Gas  witch hazel Pads 1 Application(s) Topical every 4 hours PRN Perineal discomfort

## 2023-01-17 NOTE — CONSULT NOTE ADULT - ASSESSMENT
NEURO    PULM    CARDIOVASCULAR  #Septic shock  - Switch abx to Zosyn 3.375g Q8   - ID consulted     ENDO    GI        RENAL    HEME    ID  #Chorioamnionitis   Per OB team, patient presented w. chorioamnionitis. Pt given Gentamycin 270mg    MISC  F:  E:  N:  DVT ppx:  GI ppx:  Bowel:  Dispo:    LINES    CODE STATE: 33 yo female with no significant PMHx on no medications w/ no known drug allergies presented to [facility] ED and was admitted to OBGYN service on 1/16 i/s/o active labour with concern for chorioamnionitis on admission. Pt was started on ampicillin + gentamycin and underwent vaginal term delivery (forceps-assisted) w/ episiotomy on 1/17. ICU consulted for management of persistent fever and hypotension not responsive to fluids -- concern for septic shock.      NEURO  #AAOx3   Pt mentating well at bedside. Per OB team, no changes in baseline mental status despite hypotension.   - neuro checks q6hr    PULM  #DANNY: on room air     CARDIOVASCULAR  #Septic shock 2/2 chorioamnionitis   Pt presented to ED with concern for chorioamniontis. Started on gentamycin + ampicillin per OB team. Postpartum, pt persistently hypotensive to 80s/50s (not responsive to fluid resuscitation) with persistent fevers of 101. Pt started on Clindamycin and ICU consulted. Additional 1L NS bolused with pressure bag without improvement to BPs noted. Pt AAOx3 and mentating well throughout. STAT labs showing stable Hgb with Lactate 5.8. ICU consulted for further management.   - per ID recs, stopping ampicillin and gentamycin and switching to Zosyn 4.5mg IVP q8hr   - Levophed started to maintain MAPs b/w 65-70.   - f/u repeat Lactate   - transfer to MICU for further management.     ENDO  #DANNY     GI  #DANNY        #Episiotomy  Pt with episiotomy during vaginal delivery on 1/17. Stitches placed per OB. No active bleeding noted.   - continue to monitor  - f/u OB recs     RENAL    HEME    ID  #Chorioamnionitis   Per OB team, patient presented w. chorioamnionitis. Pt given Gentamycin 270mg    MISC  F:  E:  N:  DVT ppx:  GI ppx:  Bowel:  Dispo:    LINES    CODE STATE: 31 yo female with no significant PMHx on no medications w/ no known drug allergies presented to [facility] ED and was admitted to OBGYN service on 1/16 i/s/o active labour with concern for chorioamnionitis on admission. Pt was started on ampicillin + gentamycin and underwent vaginal term delivery (forceps-assisted) w/ episiotomy on 1/17. ICU consulted for management of persistent fever and hypotension not responsive to fluids -- concern for septic shock.      NEURO  #AAOx3   Pt mentating well at bedside. Per OB team, no changes in baseline mental status despite hypotension.   - neuro checks q6hr    PULM  #DANNY: on room air     CARDIOVASCULAR  #Septic shock 2/2 chorioamnionitis   Pt presented to ED with concern for chorioamniontis. Started on gentamycin + ampicillin per OB team. Postpartum, pt persistently hypotensive to 80s/50s (not responsive to fluid resuscitation) with persistent fevers of 101. Pt started on Clindamycin and ICU consulted. Additional 1L NS bolused with pressure bag without improvement to BPs noted. Pt AAOx3 and mentating well throughout. STAT labs showing stable Hgb with Lactate 5.8. ICU consulted for further management.   - per ID recs, stopping ampicillin and gentamycin and switching to Zosyn 4.5mg IVP q8hr   - Levophed started to maintain MAPs b/w 65-70.   - f/u repeat Lactate   - transfer to MICU for further management.     ENDO  #DANNY     GI  #DANNY        #Episiotomy  Pt with episiotomy during vaginal delivery on 1/17. Stitches placed per OB. No active bleeding noted.   - continue to monitor  - f/u OB recs     ID  #Chorioamnionitis   Per OB team, patient presented w. chorioamnionitis. Pt given Gentamycin 270mg  - Management as above under septic shock  - F/U blood cx x2 (sent prior to broadening abx)  - F/U UA, urine cx   - ID consulted     DISPO: MICU / [id number]  Discussed with Dr. Holcomb

## 2023-01-17 NOTE — CONSULT NOTE ADULT - CRITICAL CARE ATTENDING COMMENT
Full term, vaginal, forceps assisted delivery c/b septic shock due to chorioamnionitis. Cultures pending. ID recs appreciated. Will c/w Clindamycin and Zosyn. Transfer to MICU.

## 2023-01-17 NOTE — H&P ADULT - ASSESSMENT
31 yo female with no significant PMHx on no medications w/ no known drug allergies presented to [facility] ED and was admitted to OBGYN service on 1/16 i/s/o active labour with concern for chorioamnionitis on admission. Pt was started on ampicillin + gentamycin and underwent vaginal term delivery (forceps-assisted) w/ episiotomy on 1/17. ICU consulted for management of persistent fever and hypotension not responsive to fluids -- concern for septic shock.      NEURO  #AAOx3   Pt mentating well at bedside. Per OB team, no changes in baseline mental status despite hypotension.   - neuro checks q6hr    PULM  #DANNY: on room air     CARDIOVASCULAR  #Septic shock 2/2 chorioamnionitis   Pt presented to ED with concern for chorioamnionitis Started on gentamycin + ampicillin per OB team. Postpartum, pt persistently hypotensive to 80s/50s (not responsive to fluid resuscitation) with persistent fevers of 101. Pt started on Clindamycin and ICU consulted. Additional 1L NS bolused with pressure bag without improvement to BPs noted. Pt AAOx3 and mentating well throughout. STAT labs showing stable Hgb with Lactate 5.8. ICU consulted for further management.   - per ID recs, stopping ampicillin and gentamycin and switching to Zosyn 4.5mg IVP q8hr   - c/w clindamycin  - Levophed started to maintain MAPs b/w 65-70.   - f/u repeat Lactate     ENDO  #DANNY     GI  #DANNY        #Episiotomy  Pt with episiotomy during vaginal delivery on 1/17. Stitches placed per OB. No active bleeding noted.   - continue to monitor  - f/u OB recs     ID  #Chorioamnionitis   Per OB team, patient presented w. chorioamnionitis. Pt given Gentamycin 270mg  - Management as above under septic shock  - F/U blood cx x2 (sent prior to broadening abx)  - F/U UA, urine cx   - ID consulted

## 2023-01-17 NOTE — H&P ADULT - HISTORY OF PRESENT ILLNESS
33 yo F with no known PMHx presents to [facility] ED on 1/16. Per OB team, patient presented at full term w/ concern for chorioamnionitis. Pt was started on Gentamycin + ampicillin by OB team. Pt had vaginal forceps-assisted delivery (w/ epidural) around 11am on 01/17 and underwent minimal blood loss of about 300cc during delivery per OB team. Post-delivery, patient noted to be hypotensive to 80s/50s with proper mentation. Per OB team, patient antibiotic regimen broadened to include Gentamycin. Pt given 2L NS bolus and kept on maintenance 125cc/hr with BPs remaining in 80s/50s range. ICU team consulted for further management of concern for septic shock. 31 yo F with no known PMHx presents to [facility] ED on 1/16. Per OB team, patient presented at full term (1st pregnancy) w/ concern for chorioamnionitis. Pt was started on Gentamycin + ampicillin by OB team. Pt had vaginal forceps-assisted delivery (w/ epidural) around 11am on 01/17 and underwent minimal blood loss of about 300cc during delivery per OB team. Post-delivery, patient noted to be hypotensive to 80s/50s with proper mentation and had fevers (T101.3F). Per OB team, patient antibiotic regimen broadened to include clindamycin/ampicillin/gentamycin. Pt given 2L NS bolus and kept on maintenance 125cc/hr with BPs remaining in 80s/50s range. ICU team consulted for further management of concern for septic shock.    ICU consult team arrived at bedside. VS notable for HR 80s, BP 80-90/60s, satting well on RA. Pt denying all ROS (no HA, dizziness, lightheadedness, chest pain, abd pain). Feels overall well s/p delivery of healthy baby girl. Given persistent hypotension, known febrile rectal temp, concern for septic shock 2/2 chorioamnionitis.    33 yo F with no known PMHx presents to [facility] ED on 1/16. Per OB team, patient presented at full term (1st pregnancy) w/ concern for chorioamnionitis. Pt was started on Gentamycin + ampicillin by OB team. Pt had vaginal forceps-assisted delivery (w/ epidural) around 11am on 01/17 and underwent minimal blood loss of about 300cc during delivery per OB team. Post-delivery, patient noted to be hypotensive to 80s/50s with proper mentation and had fevers (T101.3F). Per OB team, patient antibiotic regimen broadened to include clindamycin/ampicillin/gentamycin. Pt given 2L NS bolus and kept on maintenance 125cc/hr with BPs remaining in 80s/50s range. ICU team consulted for further management of concern for septic shock.    ICU consult team arrived at bedside. VS notable for HR 80s, BP 80-90/60s, satting well on RA. Pt denying all ROS (no HA, dizziness, lightheadedness, chest pain, abd pain). Feels overall well s/p delivery of healthy baby girl. Given persistent hypotension, known febrile rectal temp, concern for septic shock 2/2 chorioamnionitis.    Of note, patient presented to the hospital for a scheduled induction of labor and had an intrapartum fever which prompted suspicion for chorioaminitis, which was not suspected prior to arrival at the hospital.   33 yo F with no known PMHx presents to [facility] on 1/16 for scheduled induction of labor. Per OB team, patient presented at full term (1st pregnancy) and found to have an intrapartum fever which prompted suspicion for chorioaminitis, which was not suspected prior to arrival at the hospital.  Pt was started on Gentamycin + ampicillin by OB team. Pt had vaginal forceps-assisted delivery (w/ epidural) around 11am on 01/17 and underwent minimal blood loss of about 300cc during delivery per OB team. Post-delivery, patient noted to be hypotensive to 80s/50s with proper mentation and had fevers (T101.3F). Per OB team, patient antibiotic regimen broadened to include clindamycin/ampicillin/gentamycin. Pt given 2L NS bolus and kept on maintenance 125cc/hr with BPs remaining in 80s/50s range. ICU team consulted for further management of concern for septic shock.    ICU consult team arrived at bedside. VS notable for HR 80s, BP 80-90/60s, satting well on RA. Pt denying all ROS (no HA, dizziness, lightheadedness, chest pain, abd pain). Feels overall well s/p delivery of healthy baby girl. Given persistent hypotension, known febrile rectal temp, concern for septic shock 2/2 chorioamnionitis.

## 2023-01-17 NOTE — H&P ADULT - ATTENDING COMMENTS
Full term, vaginal, forceps assisted delivery c/b septic shock due to chorioamnionitis. Cultures pending. ID recs appreciated. Will c/w Clindamycin and Zosyn.

## 2023-01-17 NOTE — H&P ADULT - NSHPPHYSICALEXAM_GEN_ALL_CORE
T(C): 37.2 (01-17-23 @ 18:45), Max: 38.5 (01-17-23 @ 14:15)  HR: 79 (01-17-23 @ 19:15) (79 - 125)  BP: 89/52 (01-17-23 @ 19:15) (66/32 - 96/46)  RR: 21 (01-17-23 @ 19:15) (16 - 21)  SpO2: 97% (01-17-23 @ 19:15) (97% - 100%)    PHYSICAL EXAM:  Constitutional: Resting in bed, appears comfortable in NAD.  Eyes: clear conjunctiva  ENT:  MMM  Neck: supple  Respiratory: CTA B/L; no W/R/R  Cardiac: +S1/S2; RRR; flow murmur audible (mostly in 2nd L/R intercostal spaces)  Gastrointestinal: soft, NT/ND; no rebound or guarding; +BSx4  Extremities: WWP, non-pitting edema of lower extremities.  Musculoskeletal: NROM x4  Vascular: 2+ radial, DP/PT pulses B/L  Dermatologic: skin warm, dry and intact; no rashes, wounds, or scars  Neurologic: AAOx3; no focal deficits T(C): 37.2 (01-17-23 @ 18:45), Max: 38.5 (01-17-23 @ 14:15)  HR: 79 (01-17-23 @ 19:15) (79 - 125)  BP: 89/52 (01-17-23 @ 19:15) (66/32 - 96/46)  RR: 21 (01-17-23 @ 19:15) (16 - 21)  SpO2: 97% (01-17-23 @ 19:15) (97% - 100%)    PHYSICAL EXAM:  Constitutional: Resting in bed, appears comfortable in NAD.  Eyes: clear conjunctiva  ENT:  MMM  Neck: supple  Respiratory: CTA B/L; no W/R/R  Cardiac: +S1/S2; RRR; flow murmur audible (mostly in 2nd L/R intercostal spaces)  Gastrointestinal: soft, mod TTP b/l lower quadrants; no rebound or guarding; +BSx4  Extremities: WWP, no edema  Vascular: 2+ radial, DP/PT pulses B/L  Dermatologic: skin warm, dry and intact; no rashes, wounds, or scars  Neurologic: AAOx3; no focal deficits

## 2023-01-18 LAB
ALBUMIN SERPL ELPH-MCNC: 2.7 G/DL — LOW (ref 3.3–5)
ALP SERPL-CCNC: 120 U/L — SIGNIFICANT CHANGE UP (ref 40–120)
ALT FLD-CCNC: 8 U/L — LOW (ref 10–45)
ANION GAP SERPL CALC-SCNC: 8 MMOL/L — SIGNIFICANT CHANGE UP (ref 5–17)
ANISOCYTOSIS BLD QL: SLIGHT — SIGNIFICANT CHANGE UP
AST SERPL-CCNC: 24 U/L — SIGNIFICANT CHANGE UP (ref 10–40)
BASOPHILS # BLD AUTO: 0 K/UL — SIGNIFICANT CHANGE UP (ref 0–0.2)
BASOPHILS NFR BLD AUTO: 0 % — SIGNIFICANT CHANGE UP (ref 0–2)
BILIRUB SERPL-MCNC: 0.6 MG/DL — SIGNIFICANT CHANGE UP (ref 0.2–1.2)
BLD GP AB SCN SERPL QL: NEGATIVE — SIGNIFICANT CHANGE UP
BUN SERPL-MCNC: 7 MG/DL — SIGNIFICANT CHANGE UP (ref 7–23)
CALCIUM SERPL-MCNC: 7.9 MG/DL — LOW (ref 8.4–10.5)
CHLORIDE SERPL-SCNC: 109 MMOL/L — HIGH (ref 96–108)
CO2 SERPL-SCNC: 21 MMOL/L — LOW (ref 22–31)
CREAT SERPL-MCNC: 0.68 MG/DL — SIGNIFICANT CHANGE UP (ref 0.5–1.3)
EGFR: 119 ML/MIN/1.73M2 — SIGNIFICANT CHANGE UP
EOSINOPHIL # BLD AUTO: 0 K/UL — SIGNIFICANT CHANGE UP (ref 0–0.5)
EOSINOPHIL NFR BLD AUTO: 0 % — SIGNIFICANT CHANGE UP (ref 0–6)
GLUCOSE SERPL-MCNC: 125 MG/DL — HIGH (ref 70–99)
HCT VFR BLD CALC: 28.2 % — LOW (ref 34.5–45)
HGB BLD-MCNC: 9.2 G/DL — LOW (ref 11.5–15.5)
LYMPHOCYTES # BLD AUTO: 0.64 K/UL — LOW (ref 1–3.3)
LYMPHOCYTES # BLD AUTO: 1.7 % — LOW (ref 13–44)
MACROCYTES BLD QL: SLIGHT — SIGNIFICANT CHANGE UP
MAGNESIUM SERPL-MCNC: 1.5 MG/DL — LOW (ref 1.6–2.6)
MANUAL SMEAR VERIFICATION: SIGNIFICANT CHANGE UP
MCHC RBC-ENTMCNC: 27.9 PG — SIGNIFICANT CHANGE UP (ref 27–34)
MCHC RBC-ENTMCNC: 32.6 GM/DL — SIGNIFICANT CHANGE UP (ref 32–36)
MCV RBC AUTO: 85.5 FL — SIGNIFICANT CHANGE UP (ref 80–100)
MONOCYTES # BLD AUTO: 0.34 K/UL — SIGNIFICANT CHANGE UP (ref 0–0.9)
MONOCYTES NFR BLD AUTO: 0.9 % — LOW (ref 2–14)
NEUTROPHILS # BLD AUTO: 36.89 K/UL — HIGH (ref 1.8–7.4)
NEUTROPHILS NFR BLD AUTO: 88.6 % — HIGH (ref 43–77)
NEUTS BAND # BLD: 8.8 % — HIGH (ref 0–8)
OVALOCYTES BLD QL SMEAR: SLIGHT — SIGNIFICANT CHANGE UP
PHOSPHATE SERPL-MCNC: 4.4 MG/DL — SIGNIFICANT CHANGE UP (ref 2.5–4.5)
PLAT MORPH BLD: ABNORMAL
PLATELET # BLD AUTO: 182 K/UL — SIGNIFICANT CHANGE UP (ref 150–400)
POIKILOCYTOSIS BLD QL AUTO: SLIGHT — SIGNIFICANT CHANGE UP
POTASSIUM SERPL-MCNC: 3.6 MMOL/L — SIGNIFICANT CHANGE UP (ref 3.5–5.3)
POTASSIUM SERPL-SCNC: 3.6 MMOL/L — SIGNIFICANT CHANGE UP (ref 3.5–5.3)
PROT SERPL-MCNC: 4.6 G/DL — LOW (ref 6–8.3)
RBC # BLD: 3.3 M/UL — LOW (ref 3.8–5.2)
RBC # FLD: 13.2 % — SIGNIFICANT CHANGE UP (ref 10.3–14.5)
RBC BLD AUTO: ABNORMAL
RH IG SCN BLD-IMP: POSITIVE — SIGNIFICANT CHANGE UP
SODIUM SERPL-SCNC: 138 MMOL/L — SIGNIFICANT CHANGE UP (ref 135–145)
SPHEROCYTES BLD QL SMEAR: SLIGHT — SIGNIFICANT CHANGE UP
WBC # BLD: 37.87 K/UL — HIGH (ref 3.8–10.5)
WBC # FLD AUTO: 37.87 K/UL — HIGH (ref 3.8–10.5)

## 2023-01-18 PROCEDURE — 76604 US EXAM CHEST: CPT | Mod: 26,GC

## 2023-01-18 PROCEDURE — 99233 SBSQ HOSP IP/OBS HIGH 50: CPT | Mod: GC

## 2023-01-18 PROCEDURE — 99222 1ST HOSP IP/OBS MODERATE 55: CPT

## 2023-01-18 PROCEDURE — 93308 TTE F-UP OR LMTD: CPT | Mod: 26,GC

## 2023-01-18 RX ORDER — POTASSIUM CHLORIDE 20 MEQ
40 PACKET (EA) ORAL ONCE
Refills: 0 | Status: COMPLETED | OUTPATIENT
Start: 2023-01-18 | End: 2023-01-18

## 2023-01-18 RX ORDER — ACETAMINOPHEN 500 MG
650 TABLET ORAL EVERY 6 HOURS
Refills: 0 | Status: DISCONTINUED | OUTPATIENT
Start: 2023-01-18 | End: 2023-01-19

## 2023-01-18 RX ORDER — SODIUM CHLORIDE 9 MG/ML
500 INJECTION, SOLUTION INTRAVENOUS ONCE
Refills: 0 | Status: COMPLETED | OUTPATIENT
Start: 2023-01-18 | End: 2023-01-18

## 2023-01-18 RX ORDER — CHLORHEXIDINE GLUCONATE 213 G/1000ML
1 SOLUTION TOPICAL
Refills: 0 | Status: DISCONTINUED | OUTPATIENT
Start: 2023-01-18 | End: 2023-01-21

## 2023-01-18 RX ORDER — MAGNESIUM SULFATE 500 MG/ML
2 VIAL (ML) INJECTION ONCE
Refills: 0 | Status: COMPLETED | OUTPATIENT
Start: 2023-01-18 | End: 2023-01-18

## 2023-01-18 RX ADMIN — Medication 100 MILLIGRAM(S): at 13:23

## 2023-01-18 RX ADMIN — Medication 1 TABLET(S): at 12:20

## 2023-01-18 RX ADMIN — OXYCODONE HYDROCHLORIDE 5 MILLIGRAM(S): 5 TABLET ORAL at 18:09

## 2023-01-18 RX ADMIN — SODIUM CHLORIDE 3 MILLILITER(S): 9 INJECTION INTRAMUSCULAR; INTRAVENOUS; SUBCUTANEOUS at 22:39

## 2023-01-18 RX ADMIN — PIPERACILLIN AND TAZOBACTAM 25 GRAM(S): 4; .5 INJECTION, POWDER, LYOPHILIZED, FOR SOLUTION INTRAVENOUS at 23:31

## 2023-01-18 RX ADMIN — OXYCODONE HYDROCHLORIDE 5 MILLIGRAM(S): 5 TABLET ORAL at 19:50

## 2023-01-18 RX ADMIN — Medication 100 MILLIGRAM(S): at 22:34

## 2023-01-18 RX ADMIN — SODIUM CHLORIDE 3 MILLILITER(S): 9 INJECTION INTRAMUSCULAR; INTRAVENOUS; SUBCUTANEOUS at 04:53

## 2023-01-18 RX ADMIN — Medication 100 MILLIGRAM(S): at 06:22

## 2023-01-18 RX ADMIN — OXYCODONE HYDROCHLORIDE 5 MILLIGRAM(S): 5 TABLET ORAL at 23:34

## 2023-01-18 RX ADMIN — PIPERACILLIN AND TAZOBACTAM 25 GRAM(S): 4; .5 INJECTION, POWDER, LYOPHILIZED, FOR SOLUTION INTRAVENOUS at 14:46

## 2023-01-18 RX ADMIN — Medication 25 GRAM(S): at 07:11

## 2023-01-18 RX ADMIN — Medication 40 MILLIEQUIVALENT(S): at 07:11

## 2023-01-18 RX ADMIN — PIPERACILLIN AND TAZOBACTAM 25 GRAM(S): 4; .5 INJECTION, POWDER, LYOPHILIZED, FOR SOLUTION INTRAVENOUS at 07:10

## 2023-01-18 RX ADMIN — CHLORHEXIDINE GLUCONATE 1 APPLICATION(S): 213 SOLUTION TOPICAL at 12:20

## 2023-01-18 RX ADMIN — Medication 650 MILLIGRAM(S): at 13:05

## 2023-01-18 RX ADMIN — SODIUM CHLORIDE 500 MILLILITER(S): 9 INJECTION, SOLUTION INTRAVENOUS at 10:35

## 2023-01-18 RX ADMIN — Medication 650 MILLIGRAM(S): at 12:19

## 2023-01-18 RX ADMIN — ENOXAPARIN SODIUM 40 MILLIGRAM(S): 100 INJECTION SUBCUTANEOUS at 05:34

## 2023-01-18 NOTE — PROGRESS NOTE ADULT - SUBJECTIVE AND OBJECTIVE BOX
Patient is a 32y old  Female who presents with a chief complaint of     INTERVAL HPI/OVERNIGHT EVENTS:   No overnight events   Afebrile, hemodynamically stable     Subjective: Patient seen and examined at bedside.    ICU Vital Signs Last 24 Hrs  T(C): 36.5 (2023 05:51), Max: 38.5 (2023 14:15)  T(F): 97.7 (2023 05:51), Max: 101.3 (2023 14:15)  HR: 71 (2023 05:00) (60 - 125)  BP: 104/63 (2023 05:00) (66/32 - 107/66)  BP(mean): 78 (2023 05:00) (63 - 82)  ABP: --  ABP(mean): --  RR: 17 (2023 05:00) (14 - 24)  SpO2: 98% (2023 05:00) (97% - 100%)    O2 Parameters below as of 2023 05:00  Patient On (Oxygen Delivery Method): room air          I&O's Summary    2023 07:01  -  2023 06:06  --------------------------------------------------------  IN: 6337.1 mL / OUT: 3265 mL / NET: 3072.1 mL          PHYSICAL EXAM:  GENERAL: No acute distress   HEAD:  Atraumatic, Normocephalic  EYES: EOMI, PERRLA, conjunctiva and sclera clear  ENMT: No tonsillar erythema, exudates, or enlargement; Moist mucous membranes  NECK: Supple, No JVD, Normal thyroid  HEART: Regular rate and rhythm; No murmurs, rubs, or gallops  RESPIRATORY: CTA B/L, No W/R/R  ABDOMEN: Soft, Nontender, Nondistended; Bowel sounds present  NEUROLOGY: A&Ox3, nonfocal, moving all extremities  EXTREMITIES:  2+ Peripheral Pulses, No clubbing, cyanosis, or edema  SKIN: warm, dry, normal color, no rash or abnormal lesions    LABS:                        9.2    37.87 )-----------( 182      ( 2023 05:29 )             28.2     01-18    138  |  109<H>  |  7   ----------------------------<  125<H>  3.6   |  21<L>  |  0.68    Ca    7.9<L>      2023 05:29  Phos  4.4     -  Mg     1.5         TPro  4.6<L>  /  Alb  2.7<L>  /  TBili  0.6  /  DBili  x   /  AST  24  /  ALT  8<L>  /  AlkPhos  120  -18    PT/INR - ( 2023 17:01 )   PT: 12.5 sec;   INR: 1.05          PTT - ( 2023 17:01 )  PTT:32.1 sec  Urinalysis Basic - ( 2023 19:38 )    Color: Yellow / Appearance: Clear / S.010 / pH: x  Gluc: x / Ketone: NEGATIVE  / Bili: Negative / Urobili: 0.2 E.U./dL   Blood: x / Protein: NEGATIVE mg/dL / Nitrite: NEGATIVE   Leuk Esterase: NEGATIVE / RBC: 5-10 /HPF / WBC 5-10 /HPF   Sq Epi: x / Non Sq Epi: 0-5 /HPF / Bacteria: None /HPF      CAPILLARY BLOOD GLUCOSE            Consultant(s) Notes Reviewed:  [x ] YES  [ ] NO    MEDICATIONS  (STANDING):  clindamycin IVPB 900 milliGRAM(s) IV Intermittent every 8 hours  diphtheria/tetanus/pertussis (acellular) Vaccine (Adacel) 0.5 milliLiter(s) IntraMuscular once  enoxaparin Injectable 40 milliGRAM(s) SubCutaneous every 24 hours  lactated ringers. 1000 milliLiter(s) (1000 mL/Hr) IV Continuous <Continuous>  norepinephrine Infusion 0.05 MICROgram(s)/kG/Min (6.17 mL/Hr) IV Continuous <Continuous>  piperacillin/tazobactam IVPB.. 4.5 Gram(s) IV Intermittent every 8 hours  prenatal multivitamin 1 Tablet(s) Oral daily  sodium chloride 0.9% lock flush 3 milliLiter(s) IV Push every 8 hours    MEDICATIONS  (PRN):  dibucaine 1% Ointment 1 Application(s) Topical every 6 hours PRN Perineal discomfort  hydrocortisone 1% Cream 1 Application(s) Topical every 6 hours PRN Moderate Pain (4-6)  lanolin Ointment 1 Application(s) Topical every 6 hours PRN nipple soreness  oxyCODONE    IR 5 milliGRAM(s) Oral every 3 hours PRN Moderate to Severe Pain (4-10)  oxyCODONE    IR 5 milliGRAM(s) Oral once PRN Moderate to Severe Pain (4-10)  pramoxine 1%/zinc 5% Cream 1 Application(s) Topical every 4 hours PRN Moderate Pain (4-6)  simethicone 80 milliGRAM(s) Chew every 4 hours PRN Gas  witch hazel Pads 1 Application(s) Topical every 4 hours PRN Perineal discomfort      Care Discussed with Consultants/Other Providers [ x] YES  [ ] NO    RADIOLOGY & ADDITIONAL TESTS: Patient is a 32y old  Female who presents with a chief complaint of     INTERVAL HPI/OVERNIGHT EVENTS:   Postpartum hypotensive to 80s/50s with proper mentation and fever T101.3F. Abx broadened to clindamycin/ampicillin/gentamycin. Given 2L NS bolus + 125cc/hr with BPs remaining in 80s/50s range. ICU team consulted for further management of concern for septic shock.    Subjective: Patient seen and examined at bedside. Resting in bed, in NAD. Reporting perineal discomfort and some vaginal bleeding. Otherwise, denies any HA, lightheadedness, dizziness, syncopal episodes, CP, SOB, abd pain, n/v.     ICU Vital Signs Last 24 Hrs  T(C): 36.5 (2023 05:51), Max: 38.5 (2023 14:15)  T(F): 97.7 (2023 05:51), Max: 101.3 (2023 14:15)  HR: 71 (2023 05:00) (60 - 125)  BP: 104/63 (2023 05:00) (66/32 - 107/66)  BP(mean): 78 (2023 05:00) (63 - 82)  RR: 17 (2023 05:00) (14 - 24)  SpO2: 98% (2023 05:00) (97% - 100%)    O2 Parameters below as of 2023 05:00  Patient On (Oxygen Delivery Method): room air          I&O's Summary    2023 07:01  -  2023 06:06  --------------------------------------------------------  IN: 6337.1 mL / OUT: 3265 mL / NET: 3072.1 mL          PHYSICAL EXAM:  GENERAL: No acute distress   HEAD:  Atraumatic, Normocephalic  EYES: EOMI, conjunctiva and sclera clear  ENMT: Moist mucous membranes  NECK: Supple  HEART: Regular rate and rhythm; No murmurs, rubs, or gallops  RESPIRATORY: CTA B/L, No W/R/R  ABDOMEN: Soft, Nontender, Nondistended; Bowel sounds present  GENITOURINARY: deferred at this time; see OB note  NEUROLOGY: A&Ox3, nonfocal, moving all extremities  EXTREMITIES:  WWP, no edema, no calf tenderness  SKIN: warm, dry, normal color    LABS:                        9.2    37.87 )-----------( 182      ( 2023 05:29 )             28.2     -18    138  |  109<H>  |  7   ----------------------------<  125<H>  3.6   |  21<L>  |  0.68    Ca    7.9<L>      2023 05:29  Phos  4.4       Mg     1.5         TPro  4.6<L>  /  Alb  2.7<L>  /  TBili  0.6  /  DBili  x   /  AST  24  /  ALT  8<L>  /  AlkPhos  120  18    PT/INR - ( 2023 17:01 )   PT: 12.5 sec;   INR: 1.05          PTT - ( 2023 17:01 )  PTT:32.1 sec  Urinalysis Basic - ( 2023 19:38 )    Color: Yellow / Appearance: Clear / S.010 / pH: x  Gluc: x / Ketone: NEGATIVE  / Bili: Negative / Urobili: 0.2 E.U./dL   Blood: x / Protein: NEGATIVE mg/dL / Nitrite: NEGATIVE   Leuk Esterase: NEGATIVE / RBC: 5-10 /HPF / WBC 5-10 /HPF   Sq Epi: x / Non Sq Epi: 0-5 /HPF / Bacteria: None /HPF      CAPILLARY BLOOD GLUCOSE            Consultant(s) Notes Reviewed:  [x ] YES  [ ] NO    MEDICATIONS  (STANDING):  clindamycin IVPB 900 milliGRAM(s) IV Intermittent every 8 hours  diphtheria/tetanus/pertussis (acellular) Vaccine (Adacel) 0.5 milliLiter(s) IntraMuscular once  enoxaparin Injectable 40 milliGRAM(s) SubCutaneous every 24 hours  lactated ringers. 1000 milliLiter(s) (1000 mL/Hr) IV Continuous <Continuous>  norepinephrine Infusion 0.05 MICROgram(s)/kG/Min (6.17 mL/Hr) IV Continuous <Continuous>  piperacillin/tazobactam IVPB.. 4.5 Gram(s) IV Intermittent every 8 hours  prenatal multivitamin 1 Tablet(s) Oral daily  sodium chloride 0.9% lock flush 3 milliLiter(s) IV Push every 8 hours    MEDICATIONS  (PRN):  dibucaine 1% Ointment 1 Application(s) Topical every 6 hours PRN Perineal discomfort  hydrocortisone 1% Cream 1 Application(s) Topical every 6 hours PRN Moderate Pain (4-6)  lanolin Ointment 1 Application(s) Topical every 6 hours PRN nipple soreness  oxyCODONE    IR 5 milliGRAM(s) Oral every 3 hours PRN Moderate to Severe Pain (4-10)  oxyCODONE    IR 5 milliGRAM(s) Oral once PRN Moderate to Severe Pain (4-10)  pramoxine 1%/zinc 5% Cream 1 Application(s) Topical every 4 hours PRN Moderate Pain (4-6)  simethicone 80 milliGRAM(s) Chew every 4 hours PRN Gas  witch hazel Pads 1 Application(s) Topical every 4 hours PRN Perineal discomfort      Care Discussed with Consultants/Other Providers [ x] YES  [ ] NO    RADIOLOGY & ADDITIONAL TESTS: Patient is a 32y old  Female who presents with a chief complaint of     INTERVAL HPI/OVERNIGHT EVENTS:   Postpartum hypotensive to 80s/50s with proper mentation and fever T101.3F. Abx broadened to clindamycin/ampicillin/gentamycin. Given 2L NS bolus + 125cc/hr with BPs remaining in 80s/50s range. ICU team consulted for further management of concern for septic shock.    Subjective: Patient seen and examined at bedside. Resting in bed, in NAD. Reporting perineal discomfort and some vaginal bleeding. Otherwise, denies any HA, lightheadedness, dizziness, syncopal episodes, CP, SOB, abd pain, n/v.     ICU Vital Signs Last 24 Hrs  T(C): 36.5 (2023 05:51), Max: 38.5 (2023 14:15)  T(F): 97.7 (2023 05:51), Max: 101.3 (2023 14:15)  HR: 71 (2023 05:00) (60 - 125)  BP: 104/63 (2023 05:00) (66/32 - 107/66)  BP(mean): 78 (2023 05:00) (63 - 82)  RR: 17 (2023 05:00) (14 - 24)  SpO2: 98% (2023 05:00) (97% - 100%)    O2 Parameters below as of 2023 05:00  Patient On (Oxygen Delivery Method): room air          I&O's Summary    2023 07:01  -  2023 06:06  --------------------------------------------------------  IN: 6337.1 mL / OUT: 3265 mL / NET: 3072.1 mL          PHYSICAL EXAM:  GENERAL: No acute distress   HEAD:  Atraumatic, Normocephalic  EYES: EOMI, conjunctiva and sclera clear  ENMT: Moist mucous membranes  NECK: Supple  HEART: Regular rate and rhythm; No murmurs, rubs, or gallops  RESPIRATORY: CTA B/L, No W/R/R  ABDOMEN: Soft, Nontender, Nondistended; Bowel sounds present  GENITOURINARY: deferred at this time; see OB note  NEUROLOGY: A&Ox3, nonfocal, moving all extremities  EXTREMITIES:  WWP, trace non pitting LE edema, no calf tenderness  SKIN: warm, dry, normal color    LABS:                        9.2    37.87 )-----------( 182      ( 2023 05:29 )             28.2     18    138  |  109<H>  |  7   ----------------------------<  125<H>  3.6   |  21<L>  |  0.68    Ca    7.9<L>      2023 05:29  Phos  4.4       Mg     1.5         TPro  4.6<L>  /  Alb  2.7<L>  /  TBili  0.6  /  DBili  x   /  AST  24  /  ALT  8<L>  /  AlkPhos  120  18    PT/INR - ( 2023 17:01 )   PT: 12.5 sec;   INR: 1.05          PTT - ( 2023 17:01 )  PTT:32.1 sec  Urinalysis Basic - ( 2023 19:38 )    Color: Yellow / Appearance: Clear / S.010 / pH: x  Gluc: x / Ketone: NEGATIVE  / Bili: Negative / Urobili: 0.2 E.U./dL   Blood: x / Protein: NEGATIVE mg/dL / Nitrite: NEGATIVE   Leuk Esterase: NEGATIVE / RBC: 5-10 /HPF / WBC 5-10 /HPF   Sq Epi: x / Non Sq Epi: 0-5 /HPF / Bacteria: None /HPF      CAPILLARY BLOOD GLUCOSE            Consultant(s) Notes Reviewed:  [x ] YES  [ ] NO    MEDICATIONS  (STANDING):  clindamycin IVPB 900 milliGRAM(s) IV Intermittent every 8 hours  diphtheria/tetanus/pertussis (acellular) Vaccine (Adacel) 0.5 milliLiter(s) IntraMuscular once  enoxaparin Injectable 40 milliGRAM(s) SubCutaneous every 24 hours  lactated ringers. 1000 milliLiter(s) (1000 mL/Hr) IV Continuous <Continuous>  norepinephrine Infusion 0.05 MICROgram(s)/kG/Min (6.17 mL/Hr) IV Continuous <Continuous>  piperacillin/tazobactam IVPB.. 4.5 Gram(s) IV Intermittent every 8 hours  prenatal multivitamin 1 Tablet(s) Oral daily  sodium chloride 0.9% lock flush 3 milliLiter(s) IV Push every 8 hours    MEDICATIONS  (PRN):  dibucaine 1% Ointment 1 Application(s) Topical every 6 hours PRN Perineal discomfort  hydrocortisone 1% Cream 1 Application(s) Topical every 6 hours PRN Moderate Pain (4-6)  lanolin Ointment 1 Application(s) Topical every 6 hours PRN nipple soreness  oxyCODONE    IR 5 milliGRAM(s) Oral every 3 hours PRN Moderate to Severe Pain (4-10)  oxyCODONE    IR 5 milliGRAM(s) Oral once PRN Moderate to Severe Pain (4-10)  pramoxine 1%/zinc 5% Cream 1 Application(s) Topical every 4 hours PRN Moderate Pain (4-6)  simethicone 80 milliGRAM(s) Chew every 4 hours PRN Gas  witch hazel Pads 1 Application(s) Topical every 4 hours PRN Perineal discomfort      Care Discussed with Consultants/Other Providers [ x] YES  [ ] NO    RADIOLOGY & ADDITIONAL TESTS:

## 2023-01-18 NOTE — PROCEDURE NOTE - NSUS ED ADDITIONAL DETAIL1 FT
normal LV EF  no pericardial effusion  RV size normal
bilateral atelectasis/consolidation worse on L>>R  A line predominant pattern anteriorly

## 2023-01-18 NOTE — PATIENT PROFILE ADULT - NSPROIMPLANTSMEDDEV_GEN_A_NUR
Problem: Patient Care Overview  Goal: Plan of Care Review  Outcome: Ongoing (interventions implemented as appropriate)    Goal: Individualization and Mutuality  Outcome: Ongoing (interventions implemented as appropriate)    Goal: Discharge Needs Assessment  Outcome: Ongoing (interventions implemented as appropriate)    Goal: Interprofessional Rounds/Family Conf  Outcome: Ongoing (interventions implemented as appropriate)      Problem: Arrhythmia/Dysrhythmia (Symptomatic) (Adult)  Goal: Signs and Symptoms of Listed Potential Problems Will be Absent, Minimized or Managed (Arrhythmia/Dysrhythmia)  Outcome: Ongoing (interventions implemented as appropriate)      Problem: Fall Risk (Adult)  Goal: Identify Related Risk Factors and Signs and Symptoms  Outcome: Ongoing (interventions implemented as appropriate)    Goal: Absence of Fall  Outcome: Ongoing (interventions implemented as appropriate)   03/18/19 0153   Fall Risk (Adult)   Absence of Fall achieves outcome          None

## 2023-01-18 NOTE — PROGRESS NOTE ADULT - ASSESSMENT
Pt is 33yo single mother now para1 PPD#1 s/p FAVD for NRFHT complicated by chorio. Postpartum complicated with septic shock requiring pressor, managed by MICU overnight.  EBL 300ml  Her lactate normalized and labs are going right directions. WBC will likely come down next. Pt failed to be titrated off levo overnight and was still having hypotension as of 4am. Keeping MAP >65 per MICU.   Attempt to titrate down again this morning and so far she is tolerating.  Spoke with MICU regards to no maintenance fluid.   ID on board and Pt started on Zosyn and Clinda. Will get in touch later.  Mg repleted.   Appreciate MICU team in charge for Pt's stablization. Continue care under MICU till pressor comes off

## 2023-01-18 NOTE — CONSULT NOTE ADULT - SUBJECTIVE AND OBJECTIVE BOX
INFECTIOUS DISEASE INITIAL CONSULT NOTE    Patient is a 32y old  Female who presents with a chief complaint of Labor and Delivery, Postpartum (2023 10:39)    HPI:  31 yo F with no known PMHx presents to [facility] OB/GYN on  for scheduled induction of labor. Patient presented at full term (1st pregnancy) and found to have an intrapartum fever which prompted suspicion for chorioaminitis, which was not suspected prior to arrival at the hospital. Underwent artificial rupture of membranes. Pt was started on Gentamycin + ampicillin by OB team. Pt had vaginal forceps-assisted delivery (w/ epidural) around 11am on  and underwent minimal blood loss of about 300cc during delivery per OB team. Post-delivery, patient noted to be hypotensive to 80s/50s with proper mentation and had fevers (T101.3F). Abx antibiotic regimen broadened to include clindamycin/ampicillin/gentamycin. Pt given 2L NS bolus and kept on maintenance 125cc/hr with BPs remaining in 80s/50s range. ICU team consulted for further management of concern for septic shock and patient transferred to MICU for septic shock 2/2 chorioamnionitis.     (2023 19:13)      Interval HPI: Started on zosyn with amp/gent dc'ed. Still on pressors but weaning. Patient reports feeling well. Denies abd pain, fevers, chills.    REVIEW OF SYSTEMS  All review of systems negative, except for those above    Recent Ill Contacts:	[x] No	[] Yes:  Recent Travel History:	[] No	[x] Yes: [facility] [place], [place]  Recent Animal/Insect Exposure/Tick Bites:	[x] No	[] Yes:    Allergies    No Known Allergies    Intolerances      Antimicrobials:  clindamycin IVPB 900 milliGRAM(s) IV Intermittent every 8 hours  piperacillin/tazobactam IVPB.. 4.5 Gram(s) IV Intermittent every 8 hours      Other Medications:  acetaminophen     Tablet .. 650 milliGRAM(s) Oral every 6 hours PRN  chlorhexidine 2% Cloths 1 Application(s) Topical <User Schedule>  dibucaine 1% Ointment 1 Application(s) Topical every 6 hours PRN  diphtheria/tetanus/pertussis (acellular) Vaccine (Adacel) 0.5 milliLiter(s) IntraMuscular once  enoxaparin Injectable 40 milliGRAM(s) SubCutaneous every 24 hours  hydrocortisone 1% Cream 1 Application(s) Topical every 6 hours PRN  lanolin Ointment 1 Application(s) Topical every 6 hours PRN  norepinephrine Infusion 0.05 MICROgram(s)/kG/Min IV Continuous <Continuous>  oxyCODONE    IR 5 milliGRAM(s) Oral every 3 hours PRN  oxyCODONE    IR 5 milliGRAM(s) Oral once PRN  pramoxine 1%/zinc 5% Cream 1 Application(s) Topical every 4 hours PRN  prenatal multivitamin 1 Tablet(s) Oral daily  simethicone 80 milliGRAM(s) Chew every 4 hours PRN  sodium chloride 0.9% lock flush 3 milliLiter(s) IV Push every 8 hours  witch hazel Pads 1 Application(s) Topical every 4 hours PRN      FAMILY HISTORY:    PAST MEDICAL & SURGICAL HISTORY:    SOCIAL HISTORY:  Alcohol: not during pregnancy  Drugs: no  Tobacco: no  Employment: accounts  Living: at home with family    Daily     Daily   Head Circumference:  Vital Signs Last 24 Hrs  T(C): 36.8 (2023 13:34), Max: 37.4 (2023 17:00)  T(F): 98.2 (2023 13:34), Max: 99.3 (2023 17:00)  HR: 70 (2023 16:00) (60 - 97)  BP: 89/55 (2023 16:00) (79/38 - 115/83)  BP(mean): 67 (2023 16:00) (63 - 94)  RR: 18 (2023 16:00) (14 - 24)  SpO2: 98% (2023 16:00) (96% - 99%)    Parameters below as of 2023 16:00  Patient On (Oxygen Delivery Method): room air        PHYSICAL EXAM    HEENT: NC/AT; anicteric sclera; moist mucosal membranes.  Neck: supple, trachea midline  Cardiovascular: RRR, +S1/S2; NO M/R/G  Respiratory: CTA B/L; no W/R/R. Intubated.   Gastrointestinal: soft, NT/ND; +BSx4  Extremities: no edema or cyanosis.   Vascular: +2 radial, DP/PT pulses  Neurological: AAOx3, no FND    Respiratory Support:		[] No	[] Yes:  Vasoactive medication infusion:	[] No	[] Yes:  Venous catheters:		[] No	[] Yes:  Bladder catheter:		        [] No	[] Yes:  Other catheters or tubes:	[] No	[] Yes:    Lab Results:                        9.2    37.87 )-----------( 182      ( 2023 05:29 )             28.2     -18    138  |  109<H>  |  7   ----------------------------<  125<H>  3.6   |  21<L>  |  0.68    Ca    7.9<L>      2023 05:29  Phos  4.4       Mg     1.5         TPro  4.6<L>  /  Alb  2.7<L>  /  TBili  0.6  /  DBili  x   /  AST  24  /  ALT  8<L>  /  AlkPhos  120  18    LIVER FUNCTIONS - ( 2023 05:29 )  Alb: 2.7 g/dL / Pro: 4.6 g/dL / ALK PHOS: 120 U/L / ALT: 8 U/L / AST: 24 U/L / GGT: x           PT/INR - ( 2023 17:01 )   PT: 12.5 sec;   INR: 1.05          PTT - ( 2023 17:01 )  PTT:32.1 sec  Urinalysis Basic - ( 2023 19:38 )    Color: Yellow / Appearance: Clear / S.010 / pH: x  Gluc: x / Ketone: NEGATIVE  / Bili: Negative / Urobili: 0.2 E.U./dL   Blood: x / Protein: NEGATIVE mg/dL / Nitrite: NEGATIVE   Leuk Esterase: NEGATIVE / RBC: 5-10 /HPF / WBC 5-10 /HPF   Sq Epi: x / Non Sq Epi: 0-5 /HPF / Bacteria: None /HPF        MICROBIOLOGY  [] Pathology slides reviewed and/or discussed with pathologist  [] Microbiology findings discussed with microbiologist or slides reviewed  [] Images erviewed with radiologist  [] Case discussed with an attending physician in addition to the patient's primary physician  [] Records, reports from outside [facility] reviewed    [] Patient requires continued monitoring for:  [] Total time spent by attending physician: __ minutes, excluding procedure time.    Recommendations not final until attending attestation present.

## 2023-01-18 NOTE — CONSULT NOTE ADULT - ATTENDING COMMENTS
31yo healthy F h/o 40wk gestation p/w scheduled induction of labor.  This is her first pregnancy and no complication.  Denied fever/chills, n/v/d, abdominal pain, dysuria prior to presentation. After she was induced and had membrane ruptured as well as epidural started, she developed fever so chorioamnionitis was suspected. She was started on amp/gent by OB team. She had vaginal forceps assisted delivery around 11:00 on 1/17 with minima blood loss, had a healthy baby girl (now at Nursery).  Postpartum course c/b septic shock with interimttent fever requiring pressors and MICU transfer. Clinda was added. Patient mentating well, asymptomatic, had expected abdominal pain from delivery.  Abx switched to zosyn/clinda.  Today patient remains on low dose pressor, continues to feels well and pumping.  Denied HA, dizziness, CP, n/v/d.  Has mild abdominal pain, some lochia.  So far BCx ngtd, UA neg, lactate now cleared (peaked at 5.8).  WBC still going up now at 37.8 but bandemia improving. Suspect septic shock due to chorioamnionitis.  Overall seems to be slowly improving.  Cont zosyn 4.5g IV q8h to cover GYN pathogens.  OK to continue clinda as anti-toxin - would stop after 48-72h.     OK to give expressed breastmilk to her baby - zosyn and clinda is compatible with breastfeeding.   If patient clinically worsen or no improvement seen, then will consider abdominal imaging.    Team 1 will follow you.  Dr. Quiroga will take over the care tomorrow.  Case d/w primary team.    Denisa Lang MD, MS  Infectious Disease attending  work cell [phone]   For any questions during evening/weekend/holiday, please page ID on call

## 2023-01-18 NOTE — PROGRESS NOTE ADULT - SUBJECTIVE AND OBJECTIVE BOX
Patient was reevaluated at bedside this afternoon, resting comfortable in bed. She reports feeling well, denies fevers, chills, dizziness, chest pain, palpitations, shortness of breath, nausea, vomiting.  She has normal vaginal bleeding and appropriate perineal discomfort. Pain is well controlled with Tylenol and Motrin.  Grewal catheter in place with normal UOP (40cc/h). Tolerating regular diet well.    Physical Exam:  T(C): 36.8 (01-18-23 @ 13:34), Max: 37 (01-18-23 @ 09:27)  HR: 69 (01-18-23 @ 15:00) (61 - 83)  BP: 87/53 (01-18-23 @ 15:00) (87/53 - 115/83)  RR: 18 (01-18-23 @ 15:00) (14 - 24)  SpO2: 98% (01-18-23 @ 15:00) (96% - 99%)    GA: NAD, A&O x 3  Abd: + BS, soft, nontender, nondistended, no rebound or guarding, uterus firm.  Extremities: no swelling or calf tenderness  Perineum: lochia less than menses, intact, healing well, no hematoma                          9.2    37.87 )-----------( 182      ( 18 Jan 2023 05:29 )             28.2     01-18    138  |  109<H>  |  7   ----------------------------<  125<H>  3.6   |  21<L>  |  0.68    Ca    7.9<L>      18 Jan 2023 05:29  Phos  4.4     01-18  Mg     1.5     01-18    TPro  4.6<L>  /  Alb  2.7<L>  /  TBili  0.6  /  DBili  x   /  AST  24  /  ALT  8<L>  /  AlkPhos  120  01-18    acetaminophen     Tablet .. 650 milliGRAM(s) Oral every 6 hours PRN  chlorhexidine 2% Cloths 1 Application(s) Topical <User Schedule>  clindamycin IVPB 900 milliGRAM(s) IV Intermittent every 8 hours  dibucaine 1% Ointment 1 Application(s) Topical every 6 hours PRN  diphtheria/tetanus/pertussis (acellular) Vaccine (Adacel) 0.5 milliLiter(s) IntraMuscular once  enoxaparin Injectable 40 milliGRAM(s) SubCutaneous every 24 hours  hydrocortisone 1% Cream 1 Application(s) Topical every 6 hours PRN  lanolin Ointment 1 Application(s) Topical every 6 hours PRN  norepinephrine Infusion 0.05 MICROgram(s)/kG/Min IV Continuous <Continuous>  oxyCODONE    IR 5 milliGRAM(s) Oral every 3 hours PRN  oxyCODONE    IR 5 milliGRAM(s) Oral once PRN  piperacillin/tazobactam IVPB.. 4.5 Gram(s) IV Intermittent every 8 hours  pramoxine 1%/zinc 5% Cream 1 Application(s) Topical every 4 hours PRN  prenatal multivitamin 1 Tablet(s) Oral daily  simethicone 80 milliGRAM(s) Chew every 4 hours PRN  sodium chloride 0.9% lock flush 3 milliLiter(s) IV Push every 8 hours  witch hazel Pads 1 Application(s) Topical every 4 hours PRN

## 2023-01-18 NOTE — CONSULT NOTE ADULT - ASSESSMENT
31 yo F with no known PMHx presents to [facility] OB/GYN on 1/16 for scheduled induction of labor, found to be febrile and persistently hypotensive s/p artificial rupture of membranes and delivery of fetus. Transferred to MICU for septic shock 2/2 presumed chorioaminitis. ID consulted for antibiotic recommendations. Lactate cleared, pressor requirements decreasing. WBC uptrending, bandemia decreasing. Patient non-toxic and well appearing.    Recommend:  - c/w zosyn 4.5g q8 duration pending clinical course  - can continue clindamycin for 48-72 hours, would stop once off pressors  - f/u 1/17 BCx    ID Team 1 will follow with you. Recommendations not final until attending attestation present.

## 2023-01-18 NOTE — PROGRESS NOTE ADULT - ASSESSMENT
A/P 32y s/p FAVD, PPD #1 c/b chorioamnionitis, admitted to the MICU for hypotension probably due to septic status requiring pressors. Afebrile since 1/17 15:00.   - Pain: pain control with Motrin 600mg q6h and Tylenol 975mg q6h  - CV: On Levo, management per MICU team.   - GI: Tolerating regular diet  - : garcia in place, monitor UOP hourly  - Heme: monitor WBC and electrolytes and replete as needed.   - ID: Zosyn, can stop Clinda after 48h-72h as per ID recs.   - DVT prophylaxis: Lovenox 40mg qd, SCDs    Please page OB team for any question or concern at [phone].    A/P 32y s/p FAVD, PPD #1 c/b chorioamnionitis, admitted to the MICU for hypotension probably due to septic status requiring pressors. Afebrile since 1/17 15:00.   - Neuro: pain control with Motrin 600mg q6h and Tylenol 975mg q6h, Ocy 5mg q4h PRN  - CV: On Levo, management per MICU team.   - GI: Tolerating regular diet  - : garcia in place, monitor UOP hourly  - Heme: monitor WBC and electrolytes and replete as needed.   - ID: Zosyn, can stop Clinda after 48h-72h as per ID recs.   - DVT prophylaxis: Lovenox 40mg qd, SCDs    Please page OB team for any question or concern at [phone].

## 2023-01-18 NOTE — PROGRESS NOTE ADULT - SUBJECTIVE AND OBJECTIVE BOX
Pt sitting up in the bed, wide awake, reports feeling ok this morning. She did feel not well before the delivery when she had a fever.   Compared to last night, this morning she feels more energetic.   She denies having any cold or URI or GI symptoms. Denies sick contacts. Her BPs always have been high90's - low 100's/ 50's - 60's prior to pregnancy and never had 80's on systolic.   Denies chills, warm, thirsty, dizziness, palpitation, SOB. Grewal in place so has not moved from bed.  Do have mild fa Pt sitting up in the bed, wide awake, reports feeling ok this morning. She did feel not well before the delivery when she had a fever.   Compared to last night, this morning she feels more energetic.   She denies having any cold or URI or GI symptoms. Denies sick contacts. Her BPs always have been high90's - low 100's/ 50's - 60's prior to pregnancy and never had 80's on systolic.   Denies chills, warm, thirsty, dizziness, palpitation, SOB. Grewal in place so has not moved from bed.  Do have mild fatigue. Good amount of colostrum coming out.    Vitals   P 61-90 (mainly mid60's, baseline down from last night 80's-90)  BPs with cuff( No A line) : 82- 115/50-89 (mainly 90's-100's/50's-60's) <- with pressor and attempting on titrating levophed  O2Sat normal   RR wnl     EKG lead :NSS     Grewal draining concentrated urine   UO 30-40cc/h since 11pm last night (weight 65.8kg thus adequate but lower side )    General: alert, clear, appears fair (not unwell)  Lungs; CTA   Heart: RRR  Abdomen: fundus 2 fingers below umbilicus, not tender, soft, not distended, no rebound or guarding, BS +   : pads mild stain, minimal,   Extremities : trace edema    Pt sitting up in the bed, wide awake, reports feeling ok this morning. She did feel not well before the delivery when she had a fever.   Compared to last night, this morning she feels more energetic.   She denies having any cold or URI or GI symptoms. Denies sick contacts. Her BPs always have been high90's - low 100's/ 50's - 60's prior to pregnancy and never had 80's on systolic.   Denies chills, warm, thirsty, dizziness, palpitation, SOB. Grewal in place so has not moved from bed.  Do have mild fatigue. Good amount of colostrum coming out.    Vitals   P 61-90 (mainly mid60's, baseline down from last night 80's-90)  BPs with cuff( No A line) : 82- 115/50-89 (mainly 90's-100's/50's-60's) <- with pressor and attempting on titrating levophed  O2Sat normal   RR wnl     EKG lead :NSS     Grewal draining concentrated urine   UO 30-40cc/h since 11pm last night (weight 65.8kg thus adequate but lower side )    General: alert, clear, appears fair (not unwell)  Lungs; CTA   Heart: RRR  Abdomen: fundus 2 fingers below umbilicus, not tender, soft, not distended, no rebound or guarding, BS +   : pads mild stain, minimal,   Extremities : trace edema     WBC 37   Hb 9.7  Plt     Lactate 5.8 - >_> 1.7 this am     BCx no growth at 12hrs  Pt sitting up in the bed, wide awake, reports feeling ok this morning. She did feel not well before the delivery when she had a fever.   Compared to last night, this morning she feels more energetic.   She denies having any cold or URI or GI symptoms. Denies sick contacts. Her BPs always have been high90's - low 100's/ 50's - 60's prior to pregnancy and never had 80's on systolic.   Denies chills, warm, thirsty, dizziness, palpitation, SOB. Grewal in place so has not moved from bed.  Do have mild fatigue. Good amount of colostrum coming out.    Vitals   P 61-90 (mainly mid60's, baseline down from last night 80's-90)  BPs with cuff( No A line) : 82- 115/50-89 (mainly 90's-100's/50's-60's) <- with pressor and attempting on titrating levophed  O2Sat normal   RR wnl     EKG lead :NSS     Grewal draining concentrated urine   UO 30-40cc/h since 11pm last night (weight 65.8kg thus adequate but lower side )    General: alert, clear, appears fair (not unwell)  Lungs; CTA   Heart: RRR  Abdomen: fundus 2 fingers below umbilicus, not tender, soft, not distended, no rebound or guarding, BS +   : pads mild stain, minimal,   Extremities : trace edema     WBC 37   Hb 9.2  Plt 182    Lactate 5.8 - >_> 1.7 this am     BCx no growth at 12hrs

## 2023-01-18 NOTE — PROGRESS NOTE ADULT - ASSESSMENT
31 yo female  with no significant PMHx presented to [facility] ED wither fever and was admitted to OBGYN service on  i/s/o active labour with concern for chorioamnionitis on admission. Pt was started on ampicillin + gentamycin and underwent vaginal term delivery (forceps-assisted) w/ episiotomy on . ICU consulted for management of septic shock.      NEURO  Pt mentating well at bedside. Per OB team, no changes in baseline mental status despite hypotension.   - neuro checks q6hr    PULM  On room air     CARDIOVASCULAR  #Septic shock 2/2 chorioamnionitis   Pt presented to ED with concern for chorioamnionitis Started on gentamycin + ampicillin per OB team. Postpartum, pt persistently hypotensive to 80s/50s (not responsive to fluid resuscitation) with persistent fevers of 101. Pt started on Clindamycin and ICU consulted. Additional 1L NS bolused with pressure bag without improvement to BPs noted. Pt AAOx3 and mentating well throughout. STAT labs showing stable Hgb with Lactate 5.8  - c/w Levophed ggt; wean as tolerated   - MAP goal 65-70  - lactate clearing (5.8>3.4>1.7)  - see ID for abx regimen    GI  DANNY     RENAL/   #Episiotomy  #postpartum   Pt with episiotomy during vaginal delivery on . Stitches placed per OB. Reporting some perineal discomfort since anesthetics wearing off   - continue to monitor  - pain control - Tylenol 975mg q6h, Ocy 5mg q3h PRN   - garcia in place; monitor UOP  - f/u OB recs     ID  #Chorioamnionitis   Per OB team, patient presented w. chorioamnionitis. Pt given Gentamycin 270mg  - c/w Zosyn 4.5g q8hr and clindamycin 900mg q8h  - F/U blood cx x2 (sent prior to broadening abx)  - F/U UA, urine cx   - monitor WBC count   - ID consulted     ENDO  DANNY     PROPHYLAXIS  F: none   E: replete as needed  N: regular   GI ppx: none   DVT ppx: Lovenox 40mg qd   33 yo female  with no significant PMHx presented to [facility] ED wither fever and was admitted to OBGYN service on  i/s/o active labour with concern for chorioamnionitis on admission. Pt was started on ampicillin + gentamycin and underwent vaginal term delivery (forceps-assisted) w/ episiotomy on . ICU consulted for management of septic shock.      NEURO  Pt mentating well at bedside. Per OB team, no changes in baseline mental status despite hypotension.   - neuro checks q6hr    PULM  On room air     CARDIOVASCULAR  #Septic shock 2/2 chorioamnionitis   Pt presented to ED with concern for chorioamnionitis Started on gentamycin + ampicillin per OB team. Postpartum, pt persistently hypotensive to 80s/50s (not responsive to fluid resuscitation) with persistent fevers of 101. Pt started on Clindamycin and ICU consulted. Additional 1L NS bolused with pressure bag without improvement to BPs noted. Pt AAOx3 and mentating well throughout. STAT labs showing stable Hgb with Lactate 5.8  - c/w Levophed ggt; wean as tolerated   - MAP goal 65-70  - lactate clearing (5.8>3.4>1.7)  - see ID for abx regimen    GI  DANNY     RENAL/   #Episiotomy  #postpartum   Pt with episiotomy during vaginal delivery on . Stitches placed per OB. Reporting some perineal discomfort since anesthetics wearing off   - continue to monitor  - pain control - Tylenol 975mg q6h, Ocy 5mg q3h PRN   - garcia out, monitor UOP  - f/u OB recs     ID  #Chorioamnionitis   Per OB team, patient presented w. chorioamnionitis. Pt given Gentamycin 270mg  - c/w Zosyn 4.5g q8hr and clindamycin 900mg q8h  - F/U blood cx x2 (sent prior to broadening abx)  - F/U UA, urine cx   - monitor WBC count   - ID consulted     ENDO  DANNY     PROPHYLAXIS  F: s/p 500cc bolus LR  E: replete as needed  N: regular   GI ppx: none   DVT ppx: Lovenox 40mg qd

## 2023-01-19 LAB
ALBUMIN SERPL ELPH-MCNC: 2.2 G/DL — LOW (ref 3.3–5)
ALP SERPL-CCNC: 101 U/L — SIGNIFICANT CHANGE UP (ref 40–120)
ALT FLD-CCNC: 8 U/L — LOW (ref 10–45)
ANION GAP SERPL CALC-SCNC: 17 MMOL/L — SIGNIFICANT CHANGE UP (ref 5–17)
ANION GAP SERPL CALC-SCNC: 7 MMOL/L — SIGNIFICANT CHANGE UP (ref 5–17)
AST SERPL-CCNC: 23 U/L — SIGNIFICANT CHANGE UP (ref 10–40)
BASOPHILS # BLD AUTO: 0.06 K/UL — SIGNIFICANT CHANGE UP (ref 0–0.2)
BASOPHILS # BLD AUTO: 0.07 K/UL — SIGNIFICANT CHANGE UP (ref 0–0.2)
BASOPHILS NFR BLD AUTO: 0.3 % — SIGNIFICANT CHANGE UP (ref 0–2)
BASOPHILS NFR BLD AUTO: 0.3 % — SIGNIFICANT CHANGE UP (ref 0–2)
BILIRUB SERPL-MCNC: 0.2 MG/DL — SIGNIFICANT CHANGE UP (ref 0.2–1.2)
BUN SERPL-MCNC: 10 MG/DL — SIGNIFICANT CHANGE UP (ref 7–23)
BUN SERPL-MCNC: 9 MG/DL — SIGNIFICANT CHANGE UP (ref 7–23)
CALCIUM SERPL-MCNC: 7.2 MG/DL — LOW (ref 8.4–10.5)
CALCIUM SERPL-MCNC: 8.3 MG/DL — LOW (ref 8.4–10.5)
CHLORIDE SERPL-SCNC: 105 MMOL/L — SIGNIFICANT CHANGE UP (ref 96–108)
CHLORIDE SERPL-SCNC: 98 MMOL/L — SIGNIFICANT CHANGE UP (ref 96–108)
CO2 SERPL-SCNC: 18 MMOL/L — LOW (ref 22–31)
CO2 SERPL-SCNC: 24 MMOL/L — SIGNIFICANT CHANGE UP (ref 22–31)
CREAT SERPL-MCNC: 0.75 MG/DL — SIGNIFICANT CHANGE UP (ref 0.5–1.3)
CREAT SERPL-MCNC: 0.84 MG/DL — SIGNIFICANT CHANGE UP (ref 0.5–1.3)
EGFR: 108 ML/MIN/1.73M2 — SIGNIFICANT CHANGE UP
EGFR: 95 ML/MIN/1.73M2 — SIGNIFICANT CHANGE UP
EOSINOPHIL # BLD AUTO: 0.11 K/UL — SIGNIFICANT CHANGE UP (ref 0–0.5)
EOSINOPHIL # BLD AUTO: 0.11 K/UL — SIGNIFICANT CHANGE UP (ref 0–0.5)
EOSINOPHIL NFR BLD AUTO: 0.5 % — SIGNIFICANT CHANGE UP (ref 0–6)
EOSINOPHIL NFR BLD AUTO: 0.5 % — SIGNIFICANT CHANGE UP (ref 0–6)
GLUCOSE BLDC GLUCOMTR-MCNC: 104 MG/DL — HIGH (ref 70–99)
GLUCOSE SERPL-MCNC: 103 MG/DL — HIGH (ref 70–99)
GLUCOSE SERPL-MCNC: 411 MG/DL — HIGH (ref 70–99)
HCT VFR BLD CALC: 24.4 % — LOW (ref 34.5–45)
HCT VFR BLD CALC: 28 % — LOW (ref 34.5–45)
HGB BLD-MCNC: 7.9 G/DL — LOW (ref 11.5–15.5)
HGB BLD-MCNC: 9.1 G/DL — LOW (ref 11.5–15.5)
IMM GRANULOCYTES NFR BLD AUTO: 1 % — HIGH (ref 0–0.9)
IMM GRANULOCYTES NFR BLD AUTO: 1.1 % — HIGH (ref 0–0.9)
LYMPHOCYTES # BLD AUTO: 1.59 K/UL — SIGNIFICANT CHANGE UP (ref 1–3.3)
LYMPHOCYTES # BLD AUTO: 10.8 % — LOW (ref 13–44)
LYMPHOCYTES # BLD AUTO: 2.19 K/UL — SIGNIFICANT CHANGE UP (ref 1–3.3)
LYMPHOCYTES # BLD AUTO: 7.6 % — LOW (ref 13–44)
MAGNESIUM SERPL-MCNC: 1.6 MG/DL — SIGNIFICANT CHANGE UP (ref 1.6–2.6)
MCHC RBC-ENTMCNC: 27.8 PG — SIGNIFICANT CHANGE UP (ref 27–34)
MCHC RBC-ENTMCNC: 27.9 PG — SIGNIFICANT CHANGE UP (ref 27–34)
MCHC RBC-ENTMCNC: 32.4 GM/DL — SIGNIFICANT CHANGE UP (ref 32–36)
MCHC RBC-ENTMCNC: 32.5 GM/DL — SIGNIFICANT CHANGE UP (ref 32–36)
MCV RBC AUTO: 85.6 FL — SIGNIFICANT CHANGE UP (ref 80–100)
MCV RBC AUTO: 86.2 FL — SIGNIFICANT CHANGE UP (ref 80–100)
MONOCYTES # BLD AUTO: 0.89 K/UL — SIGNIFICANT CHANGE UP (ref 0–0.9)
MONOCYTES # BLD AUTO: 0.94 K/UL — HIGH (ref 0–0.9)
MONOCYTES NFR BLD AUTO: 4.3 % — SIGNIFICANT CHANGE UP (ref 2–14)
MONOCYTES NFR BLD AUTO: 4.7 % — SIGNIFICANT CHANGE UP (ref 2–14)
NEUTROPHILS # BLD AUTO: 16.68 K/UL — HIGH (ref 1.8–7.4)
NEUTROPHILS # BLD AUTO: 17.99 K/UL — HIGH (ref 1.8–7.4)
NEUTROPHILS NFR BLD AUTO: 82.6 % — HIGH (ref 43–77)
NEUTROPHILS NFR BLD AUTO: 86.3 % — HIGH (ref 43–77)
NRBC # BLD: 0 /100 WBCS — SIGNIFICANT CHANGE UP (ref 0–0)
NRBC # BLD: 0 /100 WBCS — SIGNIFICANT CHANGE UP (ref 0–0)
PHOSPHATE SERPL-MCNC: 4.1 MG/DL — SIGNIFICANT CHANGE UP (ref 2.5–4.5)
PLATELET # BLD AUTO: 160 K/UL — SIGNIFICANT CHANGE UP (ref 150–400)
PLATELET # BLD AUTO: 203 K/UL — SIGNIFICANT CHANGE UP (ref 150–400)
POTASSIUM SERPL-MCNC: 3.7 MMOL/L — SIGNIFICANT CHANGE UP (ref 3.5–5.3)
POTASSIUM SERPL-MCNC: 4 MMOL/L — SIGNIFICANT CHANGE UP (ref 3.5–5.3)
POTASSIUM SERPL-SCNC: 3.7 MMOL/L — SIGNIFICANT CHANGE UP (ref 3.5–5.3)
POTASSIUM SERPL-SCNC: 4 MMOL/L — SIGNIFICANT CHANGE UP (ref 3.5–5.3)
PROT SERPL-MCNC: 4.5 G/DL — LOW (ref 6–8.3)
RBC # BLD: 2.83 M/UL — LOW (ref 3.8–5.2)
RBC # BLD: 3.27 M/UL — LOW (ref 3.8–5.2)
RBC # FLD: 13.5 % — SIGNIFICANT CHANGE UP (ref 10.3–14.5)
RBC # FLD: 13.5 % — SIGNIFICANT CHANGE UP (ref 10.3–14.5)
SODIUM SERPL-SCNC: 133 MMOL/L — LOW (ref 135–145)
SODIUM SERPL-SCNC: 136 MMOL/L — SIGNIFICANT CHANGE UP (ref 135–145)
WBC # BLD: 20.13 K/UL — HIGH (ref 3.8–10.5)
WBC # BLD: 20.85 K/UL — HIGH (ref 3.8–10.5)
WBC # FLD AUTO: 20.13 K/UL — HIGH (ref 3.8–10.5)
WBC # FLD AUTO: 20.85 K/UL — HIGH (ref 3.8–10.5)

## 2023-01-19 PROCEDURE — 72190 X-RAY EXAM OF PELVIS: CPT | Mod: 26

## 2023-01-19 PROCEDURE — 99233 SBSQ HOSP IP/OBS HIGH 50: CPT

## 2023-01-19 PROCEDURE — 99291 CRITICAL CARE FIRST HOUR: CPT | Mod: GC

## 2023-01-19 RX ORDER — POTASSIUM CHLORIDE 20 MEQ
40 PACKET (EA) ORAL ONCE
Refills: 0 | Status: DISCONTINUED | OUTPATIENT
Start: 2023-01-19 | End: 2023-01-19

## 2023-01-19 RX ORDER — SODIUM CHLORIDE 9 MG/ML
500 INJECTION, SOLUTION INTRAVENOUS ONCE
Refills: 0 | Status: COMPLETED | OUTPATIENT
Start: 2023-01-19 | End: 2023-01-19

## 2023-01-19 RX ORDER — POLYETHYLENE GLYCOL 3350 17 G/17G
17 POWDER, FOR SOLUTION ORAL DAILY
Refills: 0 | Status: DISCONTINUED | OUTPATIENT
Start: 2023-01-19 | End: 2023-01-23

## 2023-01-19 RX ORDER — IBUPROFEN 200 MG
600 TABLET ORAL EVERY 6 HOURS
Refills: 0 | Status: DISCONTINUED | OUTPATIENT
Start: 2023-01-19 | End: 2023-01-23

## 2023-01-19 RX ORDER — ACETAMINOPHEN 500 MG
975 TABLET ORAL EVERY 6 HOURS
Refills: 0 | Status: DISCONTINUED | OUTPATIENT
Start: 2023-01-19 | End: 2023-01-23

## 2023-01-19 RX ORDER — SENNA PLUS 8.6 MG/1
2 TABLET ORAL AT BEDTIME
Refills: 0 | Status: DISCONTINUED | OUTPATIENT
Start: 2023-01-19 | End: 2023-01-23

## 2023-01-19 RX ORDER — OXYCODONE HYDROCHLORIDE 5 MG/1
5 TABLET ORAL EVERY 4 HOURS
Refills: 0 | Status: DISCONTINUED | OUTPATIENT
Start: 2023-01-19 | End: 2023-01-23

## 2023-01-19 RX ORDER — MAGNESIUM SULFATE 500 MG/ML
2 VIAL (ML) INJECTION ONCE
Refills: 0 | Status: DISCONTINUED | OUTPATIENT
Start: 2023-01-19 | End: 2023-01-19

## 2023-01-19 RX ADMIN — PIPERACILLIN AND TAZOBACTAM 25 GRAM(S): 4; .5 INJECTION, POWDER, LYOPHILIZED, FOR SOLUTION INTRAVENOUS at 22:08

## 2023-01-19 RX ADMIN — OXYCODONE HYDROCHLORIDE 5 MILLIGRAM(S): 5 TABLET ORAL at 06:19

## 2023-01-19 RX ADMIN — POLYETHYLENE GLYCOL 3350 17 GRAM(S): 17 POWDER, FOR SOLUTION ORAL at 12:25

## 2023-01-19 RX ADMIN — SENNA PLUS 2 TABLET(S): 8.6 TABLET ORAL at 22:08

## 2023-01-19 RX ADMIN — OXYCODONE HYDROCHLORIDE 5 MILLIGRAM(S): 5 TABLET ORAL at 07:19

## 2023-01-19 RX ADMIN — OXYCODONE HYDROCHLORIDE 5 MILLIGRAM(S): 5 TABLET ORAL at 00:34

## 2023-01-19 RX ADMIN — Medication 100 MILLIGRAM(S): at 06:00

## 2023-01-19 RX ADMIN — Medication 1 TABLET(S): at 12:25

## 2023-01-19 RX ADMIN — PIPERACILLIN AND TAZOBACTAM 25 GRAM(S): 4; .5 INJECTION, POWDER, LYOPHILIZED, FOR SOLUTION INTRAVENOUS at 14:37

## 2023-01-19 RX ADMIN — SODIUM CHLORIDE 3 MILLILITER(S): 9 INJECTION INTRAMUSCULAR; INTRAVENOUS; SUBCUTANEOUS at 21:40

## 2023-01-19 RX ADMIN — ENOXAPARIN SODIUM 40 MILLIGRAM(S): 100 INJECTION SUBCUTANEOUS at 06:00

## 2023-01-19 RX ADMIN — Medication 975 MILLIGRAM(S): at 20:35

## 2023-01-19 RX ADMIN — SODIUM CHLORIDE 1000 MILLILITER(S): 9 INJECTION, SOLUTION INTRAVENOUS at 09:34

## 2023-01-19 RX ADMIN — OXYCODONE HYDROCHLORIDE 5 MILLIGRAM(S): 5 TABLET ORAL at 12:27

## 2023-01-19 RX ADMIN — SODIUM CHLORIDE 3 MILLILITER(S): 9 INJECTION INTRAMUSCULAR; INTRAVENOUS; SUBCUTANEOUS at 13:37

## 2023-01-19 RX ADMIN — Medication 600 MILLIGRAM(S): at 17:20

## 2023-01-19 RX ADMIN — CHLORHEXIDINE GLUCONATE 1 APPLICATION(S): 213 SOLUTION TOPICAL at 05:57

## 2023-01-19 RX ADMIN — Medication 600 MILLIGRAM(S): at 22:08

## 2023-01-19 RX ADMIN — PIPERACILLIN AND TAZOBACTAM 25 GRAM(S): 4; .5 INJECTION, POWDER, LYOPHILIZED, FOR SOLUTION INTRAVENOUS at 07:33

## 2023-01-19 RX ADMIN — SODIUM CHLORIDE 3 MILLILITER(S): 9 INJECTION INTRAMUSCULAR; INTRAVENOUS; SUBCUTANEOUS at 07:36

## 2023-01-19 RX ADMIN — Medication 975 MILLIGRAM(S): at 19:29

## 2023-01-19 RX ADMIN — OXYCODONE HYDROCHLORIDE 5 MILLIGRAM(S): 5 TABLET ORAL at 13:20

## 2023-01-19 RX ADMIN — Medication 975 MILLIGRAM(S): at 14:38

## 2023-01-19 RX ADMIN — Medication 975 MILLIGRAM(S): at 15:00

## 2023-01-19 RX ADMIN — Medication 100 MILLIGRAM(S): at 21:41

## 2023-01-19 RX ADMIN — Medication 100 MILLIGRAM(S): at 13:39

## 2023-01-19 RX ADMIN — Medication 600 MILLIGRAM(S): at 17:50

## 2023-01-19 RX ADMIN — Medication 600 MILLIGRAM(S): at 22:46

## 2023-01-19 NOTE — LACTATION INITIAL EVALUATION - PRO FEEDING PLAN INFANT OB
initiation of breastfeeding/breast milk feeding
Mom is attempting B/F while pumping./initiation of breastfeeding/breast milk feeding

## 2023-01-19 NOTE — DIETITIAN INITIAL EVALUATION ADULT - PERTINENT MEDS FT
MEDICATIONS  (STANDING):  chlorhexidine 2% Cloths 1 Application(s) Topical <User Schedule>  clindamycin IVPB 900 milliGRAM(s) IV Intermittent every 8 hours  diphtheria/tetanus/pertussis (acellular) Vaccine (Adacel) 0.5 milliLiter(s) IntraMuscular once  enoxaparin Injectable 40 milliGRAM(s) SubCutaneous every 24 hours  norepinephrine Infusion 0.05 MICROgram(s)/kG/Min (6.17 mL/Hr) IV Continuous <Continuous>  piperacillin/tazobactam IVPB.. 4.5 Gram(s) IV Intermittent every 8 hours  polyethylene glycol 3350 17 Gram(s) Oral daily  prenatal multivitamin 1 Tablet(s) Oral daily  senna 2 Tablet(s) Oral at bedtime  sodium chloride 0.9% lock flush 3 milliLiter(s) IV Push every 8 hours    MEDICATIONS  (PRN):  acetaminophen     Tablet .. 650 milliGRAM(s) Oral every 6 hours PRN Temp greater or equal to 38C (100.4F), Mild Pain (1 - 3)  dibucaine 1% Ointment 1 Application(s) Topical every 6 hours PRN Perineal discomfort  hydrocortisone 1% Cream 1 Application(s) Topical every 6 hours PRN Moderate Pain (4-6)  lanolin Ointment 1 Application(s) Topical every 6 hours PRN nipple soreness  oxyCODONE    IR 5 milliGRAM(s) Oral every 3 hours PRN Moderate to Severe Pain (4-10)  oxyCODONE    IR 5 milliGRAM(s) Oral once PRN Moderate to Severe Pain (4-10)  pramoxine 1%/zinc 5% Cream 1 Application(s) Topical every 4 hours PRN Moderate Pain (4-6)  simethicone 80 milliGRAM(s) Chew every 4 hours PRN Gas  witch hazel Pads 1 Application(s) Topical every 4 hours PRN Perineal discomfort

## 2023-01-19 NOTE — PROGRESS NOTE ADULT - ASSESSMENT
31 yo female  with no significant PMHx presented to [facility] ED wither fever and was admitted to OBGYN service on  i/s/o active labour with concern for chorioamnionitis on admission. Pt was started on ampicillin + gentamycin and underwent vaginal term delivery (forceps-assisted) w/ episiotomy on . ICU consulted for management of septic shock.      NEURO  Pt mentating well at bedside. Per OB team, no changes in baseline mental status despite hypotension.   - neuro checks q6hr    PULM  On room air     CARDIOVASCULAR  #Septic shock 2/2 chorioamnionitis   Pt presented to ED with concern for chorioamnionitis Started on gentamycin + ampicillin per OB team. Postpartum, pt persistently hypotensive to 80s/50s (not responsive to fluid resuscitation) with persistent fevers of 101. Pt started on Clindamycin and ICU consulted. Additional 1L NS bolused with pressure bag without improvement to BPs noted. Pt AAOx3 and mentating well throughout. STAT labs showing stable Hgb with Lactate 5.8  - stop Levophed ggt  - fluid bolus challenge    - MAP goal 65-70  - lactate cleared (5.8>3.4>1.7)  - see ID for abx regimen    GI  DANNY   - start bowel regimen     RENAL/   #Episiotomy  #postpartum   Pt with episiotomy during vaginal delivery on . Stitches placed per OB. Reporting some perineal discomfort since anesthetics wearing off. Having difficulty walking  - continue to monitor for pain and bleeding  - pain control - Tylenol 975mg q6h, Ocy 5mg q3h PRN   - f/u pelvic XR  - garcia out, monitor UOP  - f/u OB recs     ID  #Chorioamnionitis   Per OB team, patient presented w. chorioamnionitis. Pt given Gentamycin 270mg  - c/w Zosyn 4.5g q8hr and clindamycin 900mg q8h  - BCx NGTD ()  - UA clean  - monitor WBC count   - ID consulted     ENDO  DANNY     PROPHYLAXIS  F: s/p bolus LR  E: replete as needed  N: regular   GI ppx: none   DVT ppx: Lovenox 40mg qd

## 2023-01-19 NOTE — LACTATION INITIAL EVALUATION - LACTATION INTERVENTIONS
Mom has a breast pump to use at home. Pumping guidelines and milk storage information provide/initiate/review safe skin-to-skin/initiate/review hand expression/initiate/review pumping guidelines and safe milk handling/initiate/review techniques for position and latch
initiate/review hand expression

## 2023-01-19 NOTE — PROGRESS NOTE ADULT - SUBJECTIVE AND OBJECTIVE BOX
OVERNIGHT EVENTS: MICHAEL    SUBJECTIVE / INTERVAL HPI: Patient seen and examined at bedside. Pt is feeling well. Sitting in chair. Denies chest pain, SOB, fevers, chills, abd pain.    12 point ROS negative other than stated above.     VITAL SIGNS:  Vital Signs Last 24 Hrs  T(C): 36.5 (2023 09:15), Max: 37.1 (2023 18:40)  T(F): 97.7 (2023 09:15), Max: 98.7 (2023 18:40)  HR: 67 (2023 12:00) (56 - 89)  BP: 98/59 (2023 12:00) (78/51 - 105/69)  BP(mean): 73 (2023 12:00) (59 - 83)  RR: 17 (2023 12:00) (11 - 25)  SpO2: 100% (2023 12:00) (97% - 100%)    Parameters below as of 2023 12:00  Patient On (Oxygen Delivery Method): room air        PHYSICAL EXAM:    General: NAD  HEENT: NC/AT, anicteric sclera; MMM  Neck: supple  Cardiovascular: +S1/S2; RRR  Respiratory: CTA B/L; no W/R/R  Gastrointestinal: soft, NT/ND; +BSx4  Extremities: WWP; no edema, clubbing or cyanosis  Vascular: 2+ radial, DP/PT pulses B/L  Neurological: AAOx3    MEDICATIONS:  MEDICATIONS  (STANDING):  chlorhexidine 2% Cloths 1 Application(s) Topical <User Schedule>  clindamycin IVPB 900 milliGRAM(s) IV Intermittent every 8 hours  diphtheria/tetanus/pertussis (acellular) Vaccine (Adacel) 0.5 milliLiter(s) IntraMuscular once  enoxaparin Injectable 40 milliGRAM(s) SubCutaneous every 24 hours  norepinephrine Infusion 0.05 MICROgram(s)/kG/Min (6.17 mL/Hr) IV Continuous <Continuous>  piperacillin/tazobactam IVPB.. 4.5 Gram(s) IV Intermittent every 8 hours  polyethylene glycol 3350 17 Gram(s) Oral daily  prenatal multivitamin 1 Tablet(s) Oral daily  senna 2 Tablet(s) Oral at bedtime  sodium chloride 0.9% lock flush 3 milliLiter(s) IV Push every 8 hours    MEDICATIONS  (PRN):  acetaminophen     Tablet .. 650 milliGRAM(s) Oral every 6 hours PRN Temp greater or equal to 38C (100.4F), Mild Pain (1 - 3)  dibucaine 1% Ointment 1 Application(s) Topical every 6 hours PRN Perineal discomfort  hydrocortisone 1% Cream 1 Application(s) Topical every 6 hours PRN Moderate Pain (4-6)  lanolin Ointment 1 Application(s) Topical every 6 hours PRN nipple soreness  oxyCODONE    IR 5 milliGRAM(s) Oral every 3 hours PRN Moderate to Severe Pain (4-10)  oxyCODONE    IR 5 milliGRAM(s) Oral once PRN Moderate to Severe Pain (4-10)  pramoxine 1%/zinc 5% Cream 1 Application(s) Topical every 4 hours PRN Moderate Pain (4-6)  simethicone 80 milliGRAM(s) Chew every 4 hours PRN Gas  witch hazel Pads 1 Application(s) Topical every 4 hours PRN Perineal discomfort      ALLERGIES:  Allergies    No Known Allergies    Intolerances        LABS:                        9.1    20.85 )-----------( 203      ( 2023 09:10 )             28.0     -    136  |  105  |  10  ----------------------------<  103<H>  4.0   |  24  |  0.84    Ca    8.3<L>      2023 09:10  Phos  4.1     -  Mg     1.6         TPro  4.5<L>  /  Alb  2.2<L>  /  TBili  0.2  /  DBili  x   /  AST  23  /  ALT  8<L>  /  AlkPhos  101  -19    PT/INR - ( 2023 17:01 )   PT: 12.5 sec;   INR: 1.05          PTT - ( 2023 17:01 )  PTT:32.1 sec  Urinalysis Basic - ( 2023 19:38 )    Color: Yellow / Appearance: Clear / S.010 / pH: x  Gluc: x / Ketone: NEGATIVE  / Bili: Negative / Urobili: 0.2 E.U./dL   Blood: x / Protein: NEGATIVE mg/dL / Nitrite: NEGATIVE   Leuk Esterase: NEGATIVE / RBC: 5-10 /HPF / WBC 5-10 /HPF   Sq Epi: x / Non Sq Epi: 0-5 /HPF / Bacteria: None /HPF      CAPILLARY BLOOD GLUCOSE      POCT Blood Glucose.: 104 mg/dL (2023 09:12)      RADIOLOGY & ADDITIONAL TESTS: Reviewed.    ASSESSMENT:    PLAN:     Recommendations not final until attending attestation present

## 2023-01-19 NOTE — PROGRESS NOTE ADULT - SUBJECTIVE AND OBJECTIVE BOX
31yo now  s/p LFD 1/17/23.  Obstetric course significant for post term induction at 40 1/7w at 1/16/23.  Received Cook catheter 11p, [id number]:30a.  Pt had membranes ruptured 5am.  Developed temperature 38 at 11:15a.  Pt had low forceps delivery for worsening category II FHR tracing and fever to deliver at a female infant apgars 9/9, BW 2840g at 12:14p. Right mediolateral episiotomy repaired. no additional lacerations. EBL 300cc.   postpartum course concerning for persistent hypotension, fever, elevated WBC with left shift, elevated lactate.  MICU consulted for evaluation sepsis.  Pt transferred to MICU and  remained on BP support 2 days, now weaned this afternoon.  No further fever.  Blood cultures NTD. CBC improving.    On exam this afternoon pt complained of pain and difficulty walking.  Exam significant for inability to flex thighs, wide shuffling gait.  abd soft, nontender, firm fundus. suprapubic point tenderness. lochia mild. appropriate b/l labial edema,  RML episiotomy C/D/I.  LE mild edema.  PPD2 s/p LFD.  pt with complaint of pain and difficulty with ambulation.  Exam cw pubic symphysis separation  pelvic Xray to confirm  PT consult to assist pt with ambulation.  31yo now  s/p LFD 1/17/23.  Obstetric course significant for post term induction at 40 1/7w at 1/16/23.  Received Cook catheter 11p, tyycist20:30a.  Pt had membranes ruptured 5am.  Developed temperature 38 at 11:15a.  Treated with Amp/Gent.  Pt had low forceps delivery for worsening category II FHR tracing and fever to deliver at a female infant apgars 9/9, BW 2840g at 12:14p. Right mediolateral episiotomy repaired. no additional lacerations. EBL 300cc.   postpartum course concerning for persistent hypotension, fever, elevated WBC with left shift, elevated lactate.  MICU consulted for evaluation sepsis.  Pt transferred to MICU and  remained on BP support 2 days, now weaned this afternoon.  No further fever.  Blood cultures NTD. CBC improving.    On exam this afternoon pt complained of pain and difficulty walking.  Exam significant for inability to flex thighs, wide shuffling gait.  abd soft, nontender, firm fundus. suprapubic point tenderness. lochia mild. appropriate b/l labial edema,  RML episiotomy C/D/I.  LE mild edema.  PPD2 s/p LFD.  pt with complaint of pain and difficulty with ambulation.  Exam cw pubic symphysis separation  pelvic Xray to confirm  PT consult to assist pt with ambulation.

## 2023-01-19 NOTE — DIETITIAN INITIAL EVALUATION ADULT - PERTINENT LABORATORY DATA
01-19    136  |  105  |  10  ----------------------------<  103<H>  4.0   |  24  |  0.84    Ca    8.3<L>      19 Jan 2023 09:10  Phos  4.1     01-19  Mg     1.6     01-19    TPro  4.5<L>  /  Alb  2.2<L>  /  TBili  0.2  /  DBili  x   /  AST  23  /  ALT  8<L>  /  AlkPhos  101  01-19  POCT Blood Glucose.: 104 mg/dL (01-19-23 @ 09:12)

## 2023-01-19 NOTE — DIETITIAN INITIAL EVALUATION ADULT - ADD RECOMMEND
-Continue current diet   -Encourage good PO intake  -Continue to offer preferred food and supplements to ensure adequate nutrition intake   -Monitor chemistry, GI fxn, and skin integrity

## 2023-01-19 NOTE — CHART NOTE - NSCHARTNOTEFT_GEN_A_CORE
Patient evaluated with Dr. Londono. Resting comfortably in chair. Reports feeling over all well with mild pelvic pain and difficulty walking. On exam patient gate noted to be shuffling, unable to lift legs, painful. Pain and gate improve when walking backwards. Pubic symphysis tenderness on palpation. Normal lochia, minimal labial swelling, episiotomy site c/d/i. Difficulty walking and pelvic likely due to pubic symphysis diastasis. Please obtain pelvix X-ray and PT consult.

## 2023-01-19 NOTE — DIETITIAN INITIAL EVALUATION ADULT - OTHER INFO
31 yo female with no significant PMHx on no medications w/ no known drug allergies presented to [facility] ED and was admitted to OBGYN service on 1/16 i/s/o active labour with concern for chorioamnionitis on admission. Pt was started on ampicillin + gentamycin and underwent vaginal term delivery (forceps-assisted) w/ episiotomy on 1/17. ICU consulted for management of persistent fever and hypotension not responsive to fluids -- concern for septic shock.      Pt care discussed in IDT rounds. Rx and labs reviewed. Pt presents with no overt signs of nutritional wasting. Pt presents for L/D and s/p delivery 1/17 then transferred to the MICU for consequential hypotension. Endorses a pre-pregnancy wt of 115 lbs. Pt reports a good appetite today and tolerated breakfast well. Encouraged good protein and energy intake for lactation; pt agreeable and verbalized understanding- seen by lactation. No c/o NVCD or difficult chew/swallow. NKA to food. RDN will continue to reassess, intervene, and monitor as appropriate.     Pain: 8/10, perineum   GI: Abdomen tender, +BS x4, LBM PTA  Skin: WDI, +1 BLE

## 2023-01-19 NOTE — PROGRESS NOTE ADULT - ASSESSMENT
33 yo F with no known PMHx presents to [facility] OB/GYN on 1/16 for scheduled induction of labor, found to be febrile and persistently hypotensive s/p artificial rupture of membranes and delivery of fetus. Transferred to MICU for septic shock 2/2 presumed chorioaminitis. ID consulted for antibiotic recommendations. Lactate cleared, pressor requirements decreasing. Patient non-toxic and well appearing. WBC decreasing, bandemia resolved.     Recommend:  - c/w zosyn 4.5g q8 duration pending clinical course  - stop clindamycin  - f/u 1/17 BCx    d/w MICU team  ID Team 1 will follow with you. Recommendations not final until attending attestation present.

## 2023-01-19 NOTE — PROGRESS NOTE ADULT - ASSESSMENT
A/P 32y s/p FAVD, PPD #2 c/b chorioamnionitis, admitted to the MICU for hypotension probably due to septic status requiring pressors. Afebrile since 1/17 15:00.   - Neuro: pain control with Motrin 600mg q6h and Tylenol 975mg q6h, Ocy 5mg q4h PRN  - CV: On Levo, management per MICU team.   - GI: Tolerating regular diet; consider adding on bowel regimen   - : voiding on her own, continue to follow  - Heme: monitor WBC and electrolytes and replete as needed.   - ID: Zosyn, can stop Clinda after 48h-72h as per ID recs.   - DVT prophylaxis: Lovenox 40mg qd, SCDs    Please page OB team for any question or concern at [phone].

## 2023-01-19 NOTE — PROGRESS NOTE ADULT - SUBJECTIVE AND OBJECTIVE BOX
Patient is a 32y old  Female who presents with a chief complaint of Labor and Delivery, Postpartum (2023 10:39)      INTERVAL HPI/OVERNIGHT EVENTS:   No overnight events   Afebrile, hemodynamically stable     Subjective: Patient seen and examined at bedside.    ICU Vital Signs Last 24 Hrs  T(C): 36.8 (2023 01:03), Max: 37.1 (2023 18:40)  T(F): 98.2 (2023 01:03), Max: 98.7 (2023 18:40)  HR: 63 (2023 05:45) (60 - 89)  BP: 83/50 (2023 05:45) (78/51 - 115/83)  BP(mean): 61 (2023 05:45) (59 - 94)  ABP: --  ABP(mean): --  RR: 18 (2023 05:45) (14 - 24)  SpO2: 99% (2023 05:45) (96% - 99%)    O2 Parameters below as of 2023 05:34  Patient On (Oxygen Delivery Method): room air          I&O's Summary    2023 07:01  -  2023 07:00  --------------------------------------------------------  IN: 6759.1 mL / OUT: 3345 mL / NET: 3414.1 mL    2023 07:01  -  2023 05:58  --------------------------------------------------------  IN: 1354.2 mL / OUT: 840 mL / NET: 514.2 mL          PHYSICAL EXAM:  GENERAL: No acute distress   HEAD:  Atraumatic, Normocephalic  EYES: EOMI, PERRLA, conjunctiva and sclera clear  ENMT: No tonsillar erythema, exudates, or enlargement; Moist mucous membranes  NECK: Supple, No JVD, Normal thyroid  HEART: Regular rate and rhythm; No murmurs, rubs, or gallops  RESPIRATORY: CTA B/L, No W/R/R  ABDOMEN: Soft, Nontender, Nondistended; Bowel sounds present  NEUROLOGY: A&Ox3, nonfocal, moving all extremities  EXTREMITIES:  2+ Peripheral Pulses, No clubbing, cyanosis, or edema  SKIN: warm, dry, normal color, no rash or abnormal lesions    LABS:                        9.2    37.87 )-----------( 182      ( 2023 05:29 )             28.2     01-18    138  |  109<H>  |  7   ----------------------------<  125<H>  3.6   |  21<L>  |  0.68    Ca    7.9<L>      2023 05:29  Phos  4.4       Mg     1.5         TPro  4.6<L>  /  Alb  2.7<L>  /  TBili  0.6  /  DBili  x   /  AST  24  /  ALT  8<L>  /  AlkPhos  120  18    PT/INR - ( 2023 17:01 )   PT: 12.5 sec;   INR: 1.05          PTT - ( 2023 17:01 )  PTT:32.1 sec  Urinalysis Basic - ( 2023 19:38 )    Color: Yellow / Appearance: Clear / S.010 / pH: x  Gluc: x / Ketone: NEGATIVE  / Bili: Negative / Urobili: 0.2 E.U./dL   Blood: x / Protein: NEGATIVE mg/dL / Nitrite: NEGATIVE   Leuk Esterase: NEGATIVE / RBC: 5-10 /HPF / WBC 5-10 /HPF   Sq Epi: x / Non Sq Epi: 0-5 /HPF / Bacteria: None /HPF      CAPILLARY BLOOD GLUCOSE            Consultant(s) Notes Reviewed:  [x ] YES  [ ] NO    MEDICATIONS  (STANDING):  chlorhexidine 2% Cloths 1 Application(s) Topical <User Schedule>  clindamycin IVPB 900 milliGRAM(s) IV Intermittent every 8 hours  diphtheria/tetanus/pertussis (acellular) Vaccine (Adacel) 0.5 milliLiter(s) IntraMuscular once  enoxaparin Injectable 40 milliGRAM(s) SubCutaneous every 24 hours  norepinephrine Infusion 0.05 MICROgram(s)/kG/Min (6.17 mL/Hr) IV Continuous <Continuous>  piperacillin/tazobactam IVPB.. 4.5 Gram(s) IV Intermittent every 8 hours  prenatal multivitamin 1 Tablet(s) Oral daily  sodium chloride 0.9% lock flush 3 milliLiter(s) IV Push every 8 hours    MEDICATIONS  (PRN):  acetaminophen     Tablet .. 650 milliGRAM(s) Oral every 6 hours PRN Temp greater or equal to 38C (100.4F), Mild Pain (1 - 3)  dibucaine 1% Ointment 1 Application(s) Topical every 6 hours PRN Perineal discomfort  hydrocortisone 1% Cream 1 Application(s) Topical every 6 hours PRN Moderate Pain (4-6)  lanolin Ointment 1 Application(s) Topical every 6 hours PRN nipple soreness  oxyCODONE    IR 5 milliGRAM(s) Oral every 3 hours PRN Moderate to Severe Pain (4-10)  oxyCODONE    IR 5 milliGRAM(s) Oral once PRN Moderate to Severe Pain (4-10)  pramoxine 1%/zinc 5% Cream 1 Application(s) Topical every 4 hours PRN Moderate Pain (4-6)  simethicone 80 milliGRAM(s) Chew every 4 hours PRN Gas  witch hazel Pads 1 Application(s) Topical every 4 hours PRN Perineal discomfort      Care Discussed with Consultants/Other Providers [ x] YES  [ ] NO    RADIOLOGY & ADDITIONAL TESTS: Patient is a 32y old  Female who presents with a chief complaint of Labor and Delivery, Postpartum (18 Jan 2023 10:39)      INTERVAL HPI/OVERNIGHT EVENTS:   Stopped and restarted levophed  Afebrile, hemodynamically stable     Subjective: Patient seen and examined at bedside. Resting comfortably, in NAD. C/o mild HA and difficulty sleeping overnight d/t noise, IVs. Notes that yesterday when she moved OOB to toilet, she had weakness in her legs and pelvic pain. Endorses mild vaginal pain at this time.  Denies any CP, SOB, breast pain/swelling, abd pain, n/v/d, urinary sxs. Hasnt had BM since delivery.     ICU Vital Signs Last 24 Hrs  T(C): 36.8 (19 Jan 2023 01:03), Max: 37.1 (18 Jan 2023 18:40)  T(F): 98.2 (19 Jan 2023 01:03), Max: 98.7 (18 Jan 2023 18:40)  HR: 63 (19 Jan 2023 05:45) (60 - 89)  BP: 83/50 (19 Jan 2023 05:45) (78/51 - 115/83)  BP(mean): 61 (19 Jan 2023 05:45) (59 - 94)  ABP: --  ABP(mean): --  RR: 18 (19 Jan 2023 05:45) (14 - 24)  SpO2: 99% (19 Jan 2023 05:45) (96% - 99%)    O2 Parameters below as of 19 Jan 2023 05:34  Patient On (Oxygen Delivery Method): room air          I&O's Summary    17 Jan 2023 07:01  -  18 Jan 2023 07:00  --------------------------------------------------------  IN: 6759.1 mL / OUT: 3345 mL / NET: 3414.1 mL    18 Jan 2023 07:01  -  19 Jan 2023 05:58  --------------------------------------------------------  IN: 1354.2 mL / OUT: 840 mL / NET: 514.2 mL          PHYSICAL EXAM:  GENERAL: No acute distress   HEAD:  Atraumatic, Normocephalic  EYES: EOMI, conjunctiva and sclera clear  ENMT: Moist mucous membranes  NECK: Supple, Normal thyroid  HEART: Regular rate and rhythm; No murmurs, rubs, or gallops  RESPIRATORY: CTA B/L, No W/R/R  ABDOMEN: Soft, Nontender, Nondistended; Bowel sounds present  NEUROLOGY: A&Ox3, nonfocal, moving all extremities  EXTREMITIES:  2+ Peripheral Pulses, No clubbing, cyanosis, or edema  SKIN: warm, dry, normal color, no rash or abnormal lesions      LABS:                        9.1    20.85 )-----------( 203      ( 19 Jan 2023 09:10 )             28.0     01-19    136  |  105  |  10  ----------------------------<  103<H>  4.0   |  24  |  0.84    Ca    8.3<L>      19 Jan 2023 09:10  Phos  4.1     01-19  Mg     1.6     01-19    TPro  4.5<L>  /  Alb  2.2<L>  /  TBili  0.2  /  DBili  x   /  AST  23  /  ALT  8<L>  /  AlkPhos  101  01-19        CAPILLARY BLOOD GLUCOSE      POCT Blood Glucose.: 104 mg/dL (19 Jan 2023 09:12)      Consultant(s) Notes Reviewed:  [x ] YES  [ ] NO    MEDICATIONS  (STANDING):  chlorhexidine 2% Cloths 1 Application(s) Topical <User Schedule>  clindamycin IVPB 900 milliGRAM(s) IV Intermittent every 8 hours  diphtheria/tetanus/pertussis (acellular) Vaccine (Adacel) 0.5 milliLiter(s) IntraMuscular once  enoxaparin Injectable 40 milliGRAM(s) SubCutaneous every 24 hours  norepinephrine Infusion 0.05 MICROgram(s)/kG/Min (6.17 mL/Hr) IV Continuous <Continuous>  piperacillin/tazobactam IVPB.. 4.5 Gram(s) IV Intermittent every 8 hours  prenatal multivitamin 1 Tablet(s) Oral daily  sodium chloride 0.9% lock flush 3 milliLiter(s) IV Push every 8 hours    MEDICATIONS  (PRN):  acetaminophen     Tablet .. 650 milliGRAM(s) Oral every 6 hours PRN Temp greater or equal to 38C (100.4F), Mild Pain (1 - 3)  dibucaine 1% Ointment 1 Application(s) Topical every 6 hours PRN Perineal discomfort  hydrocortisone 1% Cream 1 Application(s) Topical every 6 hours PRN Moderate Pain (4-6)  lanolin Ointment 1 Application(s) Topical every 6 hours PRN nipple soreness  oxyCODONE    IR 5 milliGRAM(s) Oral every 3 hours PRN Moderate to Severe Pain (4-10)  oxyCODONE    IR 5 milliGRAM(s) Oral once PRN Moderate to Severe Pain (4-10)  pramoxine 1%/zinc 5% Cream 1 Application(s) Topical every 4 hours PRN Moderate Pain (4-6)  simethicone 80 milliGRAM(s) Chew every 4 hours PRN Gas  witch hazel Pads 1 Application(s) Topical every 4 hours PRN Perineal discomfort      Care Discussed with Consultants/Other Providers [ x] YES  [ ] NO    RADIOLOGY & ADDITIONAL TESTS:

## 2023-01-19 NOTE — PROGRESS NOTE ADULT - SUBJECTIVE AND OBJECTIVE BOX
Patient evaluated at bedside this morning, resting comfortable in bed, no acute events overnight.  She reports pain is well controlled with tylenol and motrin.  She denies headache, dizziness, chest pain, palpitations, shortness of breath, nausea, vomiting, heavy vaginal bleeding or perineal discomfort. Reports decrease in amount of vaginal bleeding and denies clots.  She has been ambulating without assistance, voiding spontaneously.  Tolerating food well, without nausea/vomit.      Physical Exam:  T(C): 36.5 (01-19-23 @ 09:15), Max: 36.9 (01-18-23 @ 22:17)  HR: 62 (01-19-23 @ 09:00) (56 - 83)  BP: 103/67 (01-19-23 @ 09:00) (78/51 - 103/67)  RR: 13 (01-19-23 @ 09:00) (13 - 25)  SpO2: 98% (01-19-23 @ 09:00) (97% - 99%)    GA: NAD, comfortable, conversational  Abd: soft, nontender, nondistended, no rebound or guarding, uterus firm.  Extremities: no swelling or calf tenderness  Perineum: lochia wnl                          7.9    20.13 )-----------( 160      ( 19 Jan 2023 05:30 )             24.4     01-19    133<L>  |  98  |  9   ----------------------------<  411<H>  3.7   |  18<L>  |  0.75    Ca    7.2<L>      19 Jan 2023 05:30  Phos  4.1     01-19  Mg     1.6     01-19    TPro  4.5<L>  /  Alb  2.2<L>  /  TBili  0.2  /  DBili  x   /  AST  23  /  ALT  8<L>  /  AlkPhos  101  01-19    acetaminophen     Tablet .. 650 milliGRAM(s) Oral every 6 hours PRN  chlorhexidine 2% Cloths 1 Application(s) Topical <User Schedule>  clindamycin IVPB 900 milliGRAM(s) IV Intermittent every 8 hours  dibucaine 1% Ointment 1 Application(s) Topical every 6 hours PRN  diphtheria/tetanus/pertussis (acellular) Vaccine (Adacel) 0.5 milliLiter(s) IntraMuscular once  enoxaparin Injectable 40 milliGRAM(s) SubCutaneous every 24 hours  hydrocortisone 1% Cream 1 Application(s) Topical every 6 hours PRN  lanolin Ointment 1 Application(s) Topical every 6 hours PRN  norepinephrine Infusion 0.05 MICROgram(s)/kG/Min IV Continuous <Continuous>  oxyCODONE    IR 5 milliGRAM(s) Oral every 3 hours PRN  oxyCODONE    IR 5 milliGRAM(s) Oral once PRN  piperacillin/tazobactam IVPB.. 4.5 Gram(s) IV Intermittent every 8 hours  polyethylene glycol 3350 17 Gram(s) Oral daily  pramoxine 1%/zinc 5% Cream 1 Application(s) Topical every 4 hours PRN  prenatal multivitamin 1 Tablet(s) Oral daily  senna 2 Tablet(s) Oral at bedtime  simethicone 80 milliGRAM(s) Chew every 4 hours PRN  sodium chloride 0.9% lock flush 3 milliLiter(s) IV Push every 8 hours  witch hazel Pads 1 Application(s) Topical every 4 hours PRN

## 2023-01-19 NOTE — LACTATION INITIAL EVALUATION - NS LACT CON REASON FOR REQ
mom admitted to ICU s/p chorio, baby in WBN and not present during consult. discussed hand expression technique and collection. reassurance provided all questions answered primary RN made aware./primaparous mom
Routine consult/pump request/primaparous mom

## 2023-01-19 NOTE — DIETITIAN INITIAL EVALUATION ADULT - OTHER CALCULATIONS
Needs determined using pre-pregnancy wt of 52.3kg adjusted for lactation and s/p delivery   EEN: 9723-9953 kcal (25-30 +500 kcal/kg)  EPN: 71-78 gProt. (71 gProt. RDI +10%)  **Fluids per team

## 2023-01-20 LAB
ALBUMIN SERPL ELPH-MCNC: 2.6 G/DL — LOW (ref 3.3–5)
ALP SERPL-CCNC: 86 U/L — SIGNIFICANT CHANGE UP (ref 40–120)
ALT FLD-CCNC: 10 U/L — SIGNIFICANT CHANGE UP (ref 10–45)
ANION GAP SERPL CALC-SCNC: 7 MMOL/L — SIGNIFICANT CHANGE UP (ref 5–17)
APPEARANCE UR: CLEAR — SIGNIFICANT CHANGE UP
AST SERPL-CCNC: 19 U/L — SIGNIFICANT CHANGE UP (ref 10–40)
BACTERIA # UR AUTO: PRESENT /HPF
BASOPHILS # BLD AUTO: 0.04 K/UL — SIGNIFICANT CHANGE UP (ref 0–0.2)
BASOPHILS NFR BLD AUTO: 0.4 % — SIGNIFICANT CHANGE UP (ref 0–2)
BILIRUB SERPL-MCNC: 0.2 MG/DL — SIGNIFICANT CHANGE UP (ref 0.2–1.2)
BILIRUB UR-MCNC: NEGATIVE — SIGNIFICANT CHANGE UP
BUN SERPL-MCNC: 10 MG/DL — SIGNIFICANT CHANGE UP (ref 7–23)
CALCIUM SERPL-MCNC: 7.9 MG/DL — LOW (ref 8.4–10.5)
CHLORIDE SERPL-SCNC: 102 MMOL/L — SIGNIFICANT CHANGE UP (ref 96–108)
CO2 SERPL-SCNC: 25 MMOL/L — SIGNIFICANT CHANGE UP (ref 22–31)
COLOR SPEC: YELLOW — SIGNIFICANT CHANGE UP
CREAT SERPL-MCNC: 0.73 MG/DL — SIGNIFICANT CHANGE UP (ref 0.5–1.3)
DIFF PNL FLD: ABNORMAL
EGFR: 112 ML/MIN/1.73M2 — SIGNIFICANT CHANGE UP
EOSINOPHIL # BLD AUTO: 0.12 K/UL — SIGNIFICANT CHANGE UP (ref 0–0.5)
EOSINOPHIL NFR BLD AUTO: 1.1 % — SIGNIFICANT CHANGE UP (ref 0–6)
EPI CELLS # UR: ABNORMAL /HPF (ref 0–5)
GLUCOSE SERPL-MCNC: 85 MG/DL — SIGNIFICANT CHANGE UP (ref 70–99)
GLUCOSE UR QL: NEGATIVE — SIGNIFICANT CHANGE UP
HCT VFR BLD CALC: 27.5 % — LOW (ref 34.5–45)
HGB BLD-MCNC: 8.9 G/DL — LOW (ref 11.5–15.5)
IMM GRANULOCYTES NFR BLD AUTO: 1.1 % — HIGH (ref 0–0.9)
KETONES UR-MCNC: NEGATIVE — SIGNIFICANT CHANGE UP
LEUKOCYTE ESTERASE UR-ACNC: ABNORMAL
LYMPHOCYTES # BLD AUTO: 1.84 K/UL — SIGNIFICANT CHANGE UP (ref 1–3.3)
LYMPHOCYTES # BLD AUTO: 17.5 % — SIGNIFICANT CHANGE UP (ref 13–44)
MAGNESIUM SERPL-MCNC: 1.8 MG/DL — SIGNIFICANT CHANGE UP (ref 1.6–2.6)
MCHC RBC-ENTMCNC: 27.6 PG — SIGNIFICANT CHANGE UP (ref 27–34)
MCHC RBC-ENTMCNC: 32.4 GM/DL — SIGNIFICANT CHANGE UP (ref 32–36)
MCV RBC AUTO: 85.1 FL — SIGNIFICANT CHANGE UP (ref 80–100)
MONOCYTES # BLD AUTO: 0.58 K/UL — SIGNIFICANT CHANGE UP (ref 0–0.9)
MONOCYTES NFR BLD AUTO: 5.5 % — SIGNIFICANT CHANGE UP (ref 2–14)
NEUTROPHILS # BLD AUTO: 7.81 K/UL — HIGH (ref 1.8–7.4)
NEUTROPHILS NFR BLD AUTO: 74.4 % — SIGNIFICANT CHANGE UP (ref 43–77)
NITRITE UR-MCNC: NEGATIVE — SIGNIFICANT CHANGE UP
NRBC # BLD: 0 /100 WBCS — SIGNIFICANT CHANGE UP (ref 0–0)
OSMOLALITY UR: 685 MOSM/KG — SIGNIFICANT CHANGE UP (ref 300–900)
PH UR: 7 — SIGNIFICANT CHANGE UP (ref 5–8)
PHOSPHATE SERPL-MCNC: 5.6 MG/DL — HIGH (ref 2.5–4.5)
PLATELET # BLD AUTO: 192 K/UL — SIGNIFICANT CHANGE UP (ref 150–400)
POTASSIUM SERPL-MCNC: 3.9 MMOL/L — SIGNIFICANT CHANGE UP (ref 3.5–5.3)
POTASSIUM SERPL-SCNC: 3.9 MMOL/L — SIGNIFICANT CHANGE UP (ref 3.5–5.3)
PROT SERPL-MCNC: 4.9 G/DL — LOW (ref 6–8.3)
PROT UR-MCNC: ABNORMAL MG/DL
RBC # BLD: 3.23 M/UL — LOW (ref 3.8–5.2)
RBC # FLD: 13.2 % — SIGNIFICANT CHANGE UP (ref 10.3–14.5)
RBC CASTS # UR COMP ASSIST: ABNORMAL /HPF
SODIUM SERPL-SCNC: 134 MMOL/L — LOW (ref 135–145)
SODIUM UR-SCNC: 201 MMOL/L — SIGNIFICANT CHANGE UP
SP GR SPEC: 1.02 — SIGNIFICANT CHANGE UP (ref 1–1.03)
UROBILINOGEN FLD QL: 0.2 E.U./DL — SIGNIFICANT CHANGE UP
WBC # BLD: 10.51 K/UL — HIGH (ref 3.8–10.5)
WBC # FLD AUTO: 10.51 K/UL — HIGH (ref 3.8–10.5)
WBC UR QL: ABNORMAL /HPF

## 2023-01-20 PROCEDURE — 99233 SBSQ HOSP IP/OBS HIGH 50: CPT

## 2023-01-20 PROCEDURE — 99223 1ST HOSP IP/OBS HIGH 75: CPT | Mod: GC

## 2023-01-20 RX ORDER — IRON SUCROSE 20 MG/ML
200 INJECTION, SOLUTION INTRAVENOUS ONCE
Refills: 0 | Status: COMPLETED | OUTPATIENT
Start: 2023-01-20 | End: 2023-01-20

## 2023-01-20 RX ORDER — MAGNESIUM SULFATE 500 MG/ML
1 VIAL (ML) INJECTION ONCE
Refills: 0 | Status: COMPLETED | OUTPATIENT
Start: 2023-01-20 | End: 2023-01-20

## 2023-01-20 RX ADMIN — SENNA PLUS 2 TABLET(S): 8.6 TABLET ORAL at 22:23

## 2023-01-20 RX ADMIN — Medication 100 MILLIGRAM(S): at 05:01

## 2023-01-20 RX ADMIN — Medication 975 MILLIGRAM(S): at 02:40

## 2023-01-20 RX ADMIN — PIPERACILLIN AND TAZOBACTAM 25 GRAM(S): 4; .5 INJECTION, POWDER, LYOPHILIZED, FOR SOLUTION INTRAVENOUS at 22:24

## 2023-01-20 RX ADMIN — PIPERACILLIN AND TAZOBACTAM 25 GRAM(S): 4; .5 INJECTION, POWDER, LYOPHILIZED, FOR SOLUTION INTRAVENOUS at 05:00

## 2023-01-20 RX ADMIN — ENOXAPARIN SODIUM 40 MILLIGRAM(S): 100 INJECTION SUBCUTANEOUS at 05:01

## 2023-01-20 RX ADMIN — Medication 1 TABLET(S): at 10:51

## 2023-01-20 RX ADMIN — IRON SUCROSE 110 MILLIGRAM(S): 20 INJECTION, SOLUTION INTRAVENOUS at 21:28

## 2023-01-20 RX ADMIN — Medication 975 MILLIGRAM(S): at 07:00

## 2023-01-20 RX ADMIN — Medication 975 MILLIGRAM(S): at 01:21

## 2023-01-20 RX ADMIN — SODIUM CHLORIDE 3 MILLILITER(S): 9 INJECTION INTRAMUSCULAR; INTRAVENOUS; SUBCUTANEOUS at 17:01

## 2023-01-20 RX ADMIN — Medication 600 MILLIGRAM(S): at 17:06

## 2023-01-20 RX ADMIN — Medication 600 MILLIGRAM(S): at 06:43

## 2023-01-20 RX ADMIN — Medication 100 GRAM(S): at 06:46

## 2023-01-20 RX ADMIN — Medication 975 MILLIGRAM(S): at 15:05

## 2023-01-20 RX ADMIN — Medication 975 MILLIGRAM(S): at 14:23

## 2023-01-20 RX ADMIN — Medication 600 MILLIGRAM(S): at 19:49

## 2023-01-20 RX ADMIN — Medication 600 MILLIGRAM(S): at 05:01

## 2023-01-20 RX ADMIN — Medication 975 MILLIGRAM(S): at 07:15

## 2023-01-20 RX ADMIN — SODIUM CHLORIDE 3 MILLILITER(S): 9 INJECTION INTRAMUSCULAR; INTRAVENOUS; SUBCUTANEOUS at 05:01

## 2023-01-20 RX ADMIN — Medication 975 MILLIGRAM(S): at 19:40

## 2023-01-20 RX ADMIN — CHLORHEXIDINE GLUCONATE 1 APPLICATION(S): 213 SOLUTION TOPICAL at 05:01

## 2023-01-20 RX ADMIN — PIPERACILLIN AND TAZOBACTAM 25 GRAM(S): 4; .5 INJECTION, POWDER, LYOPHILIZED, FOR SOLUTION INTRAVENOUS at 14:54

## 2023-01-20 RX ADMIN — Medication 600 MILLIGRAM(S): at 10:51

## 2023-01-20 NOTE — PROGRESS NOTE ADULT - SUBJECTIVE AND OBJECTIVE BOX
OVERNIGHT EVENTS: MICHAEL    SUBJECTIVE / INTERVAL HPI: Patient seen and examined at bedside. Pt is feeling well. Denies chest pain, SOB, fevers, chills, abd pain.    12 point ROS negative other than stated above.     VITAL SIGNS:  Vital Signs Last 24 Hrs  T(C): 37.1 (20 Jan 2023 09:52), Max: 37.1 (20 Jan 2023 09:52)  T(F): 98.7 (20 Jan 2023 09:52), Max: 98.7 (20 Jan 2023 09:52)  HR: 85 (20 Jan 2023 11:00) (57 - 85)  BP: 98/64 (20 Jan 2023 11:00) (87/54 - 113/62)  BP(mean): 77 (20 Jan 2023 11:00) (66 - 81)  RR: 16 (20 Jan 2023 11:00) (13 - 28)  SpO2: 98% (20 Jan 2023 11:00) (96% - 100%)    Parameters below as of 20 Jan 2023 11:00  Patient On (Oxygen Delivery Method): room air        PHYSICAL EXAM:    General: NAD  HEENT: NC/AT, anicteric sclera; MMM  Neck: supple  Cardiovascular: +S1/S2; RRR  Respiratory: CTA B/L; no W/R/R  Gastrointestinal: soft, NT/ND; +BSx4  Extremities: WWP; no edema, clubbing or cyanosis  Vascular: 2+ radial, DP/PT pulses B/L  Neurological: AAOx3    MEDICATIONS:  MEDICATIONS  (STANDING):  acetaminophen     Tablet .. 975 milliGRAM(s) Oral every 6 hours  chlorhexidine 2% Cloths 1 Application(s) Topical <User Schedule>  diphtheria/tetanus/pertussis (acellular) Vaccine (Adacel) 0.5 milliLiter(s) IntraMuscular once  enoxaparin Injectable 40 milliGRAM(s) SubCutaneous every 24 hours  ibuprofen  Tablet. 600 milliGRAM(s) Oral every 6 hours  piperacillin/tazobactam IVPB.. 4.5 Gram(s) IV Intermittent every 8 hours  polyethylene glycol 3350 17 Gram(s) Oral daily  prenatal multivitamin 1 Tablet(s) Oral daily  senna 2 Tablet(s) Oral at bedtime  sodium chloride 0.9% lock flush 3 milliLiter(s) IV Push every 8 hours    MEDICATIONS  (PRN):  dibucaine 1% Ointment 1 Application(s) Topical every 6 hours PRN Perineal discomfort  hydrocortisone 1% Cream 1 Application(s) Topical every 6 hours PRN Moderate Pain (4-6)  lanolin Ointment 1 Application(s) Topical every 6 hours PRN nipple soreness  oxyCODONE    IR 5 milliGRAM(s) Oral every 4 hours PRN Severe Pain (7 - 10)  pramoxine 1%/zinc 5% Cream 1 Application(s) Topical every 4 hours PRN Moderate Pain (4-6)  simethicone 80 milliGRAM(s) Chew every 4 hours PRN Gas  witch hazel Pads 1 Application(s) Topical every 4 hours PRN Perineal discomfort      ALLERGIES:  Allergies    No Known Allergies    Intolerances        LABS:                        8.9    10.51 )-----------( 192      ( 20 Jan 2023 05:21 )             27.5     01-20    134<L>  |  102  |  10  ----------------------------<  85  3.9   |  25  |  0.73    Ca    7.9<L>      20 Jan 2023 05:21  Phos  5.6     01-20  Mg     1.8     01-20    TPro  4.9<L>  /  Alb  2.6<L>  /  TBili  0.2  /  DBili  x   /  AST  19  /  ALT  10  /  AlkPhos  86  01-20        CAPILLARY BLOOD GLUCOSE      POCT Blood Glucose.: 104 mg/dL (19 Jan 2023 09:12)      RADIOLOGY & ADDITIONAL TESTS: Reviewed.    ASSESSMENT:    PLAN:     Recommendations not final until attending attestation present

## 2023-01-20 NOTE — PROGRESS NOTE ADULT - ASSESSMENT
31 yo female  with no significant PMHx presented to [facility] ED wither fever and was admitted to OBGYN service on  i/s/o active labour with concern for chorioamnionitis on admission. Pt was started on ampicillin + gentamycin and underwent vaginal term delivery (forceps-assisted) w/ episiotomy on . ICU consulted for management of septic shock.      NEURO  Pt mentating well at bedside. Per OB team, no changes in baseline mental status despite hypotension.   - neuro checks q6hr    PULM  On room air     CARDIOVASCULAR  #Septic shock 2/2 chorioamnionitis   Pt presented to ED with concern for chorioamnionitis Started on gentamycin + ampicillin per OB team. Postpartum, pt persistently hypotensive to 80s/50s (not responsive to fluid resuscitation) with persistent fevers of 101. Pt started on Clindamycin and ICU consulted. Additional 1L NS bolused with pressure bag without improvement to BPs noted. Pt AAOx3 and mentating well throughout. STAT labs showing stable Hgb with Lactate 5.8  - stop Levophed ggt  - fluid bolus challenge    - MAP goal 65-70  - lactate cleared (5.8>3.4>1.7)  - see ID for abx regimen    GI  DANNY   - start bowel regimen     RENAL/   #Episiotomy  #postpartum   Pt with episiotomy during vaginal delivery on . Stitches placed per OB. Reporting some perineal discomfort since anesthetics wearing off. Having difficulty walking  - continue to monitor for pain and bleeding  - pain control - Tylenol 975mg q6h, Ocy 5mg q3h PRN   - f/u pelvic XR  - garcia out, monitor UOP  - f/u OB recs     ID  #Chorioamnionitis   Per OB team, patient presented w. chorioamnionitis. Pt given Gentamycin 270mg  - c/w Zosyn 4.5g q8hr and clindamycin 900mg q8h  - BCx NGTD ()  - UA clean  - monitor WBC count   - ID consulted     ENDO  DANNY     PROPHYLAXIS  F: s/p bolus LR  E: replete as needed  N: regular   GI ppx: none   DVT ppx: Lovenox 40mg qd   33 yo female  with no significant PMHx presented to [facility] ED wither fever and was admitted to OBGYN service on  i/s/o active labour with concern for chorioamnionitis on admission. Pt was started on ampicillin + gentamycin and underwent vaginal term delivery (forceps-assisted) w/ episiotomy on . ICU consulted for management of septic shock.      NEURO  Pt mentating well at bedside. Per OB team, no changes in baseline mental status despite hypotension.   - neuro checks q6hr    PULM  On room air     CARDIOVASCULAR  #Septic shock 2/2 chorioamnionitis   Now resolved. Pt presented to ED with concern for chorioamnionitis Started on gentamycin + ampicillin per OB team. Postpartum, pt persistently hypotensive to 80s/50s (not responsive to fluid resuscitation) with persistent fevers of 101. Pt started on Clindamycin and ICU consulted. Additional 1L NS bolused with pressure bag without improvement to BPs noted. Pt AAOx3 and mentating well throughout.  - stop Levophed ggt  - passed fluid bolus challenge    - MAP goal 65-70  - lactate cleared (5.8>3.4>1.7)  - see ID for abx regimen    GI  DANNY   - start bowel regimen     RENAL/   #Episiotomy  #postpartum   Pt with episiotomy during vaginal delivery on . Stitches placed per OB. Reporting some perineal discomfort since anesthetics wearing off. Having difficulty walking  - pain control - Tylenol 975mg q6h, Ocy 5mg q3h PRN   - pelvic XR showing pubic symphysis diastasis  - garcia out, monitor UOP  - f/u OB recs     #Hyponatremia   Na 134. Possibly 2/2 SIADH 2/2 pain   - f/u urine studies     ID  #Chorioamnionitis   Per OB team, patient presented w. chorioamnionitis. Pt given Gentamycin 270mg  - stop clindamycin 900mg q8 (3d course)  - c/w Zosyn 4.5g q8hr (-)  - BCx NGTD ()  - UA clean  - monitor WBC count   - ID consulted     ENDO  DANNY     PROPHYLAXIS  F: s/p bolus LR  E: replete as needed  N: regular   GI ppx: none   DVT ppx: Lovenox 40mg qd

## 2023-01-20 NOTE — PHYSICAL THERAPY INITIAL EVALUATION ADULT - GAIT DEVIATIONS NOTED, PT EVAL
antalgic gait patern with decreased stance on LLE and decreased LE clearance bilat/decreased luis armando/increased time in double stance

## 2023-01-20 NOTE — PROGRESS NOTE ADULT - ASSESSMENT
A/P 32y s/p FAVD, PPD #3 c/b chorioamnionitis, admitted to the MICU for hypotension likely due to septic status requiring pressors. Afebrile since 1/17 15:00.   - Step down from MICU today to PP unit  - Neuro: pain control with Motrin 600mg q6h and Tylenol 975mg q6h, Oxy 5mg q4h PRN  - CV: normotensive   - GI: Tolerating regular diet; consider adding on bowel regimen   - : voiding on her own, continue to follow  - Heme: monitor WBC and electrolytes and replete as needed.   - ID: Zosyn q8h  - DVT prophylaxis: Lovenox 40mg qd, SCDs    Please page OB team for any question or concern at [phone].    A/P 32y s/p FAVD, PPD #3 c/b chorioamnionitis and pubic symphasis diastasis, admitted to the MICU for hypotension likely due to septic status requiring pressors. Afebrile since 1/17 15:00.   - Step down from MICU today to PP unit  - Pubic symphasis pain:          -Pelvic Xray 1/19: Pubic symphysis diastases, bony fragment. Intact hip joints.          -PT today  - Neuro: pain control with Motrin 600mg q6h and Tylenol 975mg q6h, Oxy 5mg q4h PRN  - CV: normotensive   - GI: Tolerating regular diet; consider adding on bowel regimen   - : voiding on her own, continue to follow  - Heme: monitor WBC and electrolytes and replete as needed.   - ID: Zosyn q8h  - DVT prophylaxis: Lovenox 40mg qd, SCDs    Please page OB team for any question or concern at [phone].

## 2023-01-20 NOTE — PHYSICAL THERAPY INITIAL EVALUATION ADULT - PERTINENT HX OF CURRENT PROBLEM, REHAB EVAL
Pt. is a 32 y.o female s/p FAVD, now PPD#3 with course c/b sepsis and hypotension requiring pressors i/s/o chorioamnionitis. Pt. referred to PT due to pt. p/w difficulty ambulating i/s/o confirmed pubis symphysis diastasis .

## 2023-01-20 NOTE — PHYSICAL THERAPY INITIAL EVALUATION ADULT - PREDICTED DURATION OF THERAPY (DAYS/WKS), PT EVAL
Pt. would benefit from cont. PT f/u to improve gait, transfers, balance, ability to negotiate stairs. .

## 2023-01-20 NOTE — PROGRESS NOTE ADULT - TIME BILLING
Continued clinical improvement; minimal pressor requirement (per documentation and patient, baseline SBP ); advise complete adjunctive clindamycin (for possible anti-toxin effect) today; balancing benefits of continuing with risk of developing C. difficile colitis; continue Zosyn; breastfeeding compatible.
s/p clindamycin. Clinically improved; resolving infection; discussed with patient option to stay in house thru weekend to complete IV Zosyn course (and continue breastfeeding) vs. transition to PO cefpodoxime/metronidazole (and pump and dump); patient opts for former. Could go home after AM dose of Zosyn on 1/23 without transition to PO antibiotics from ID perspective.    Please reconsult with ?

## 2023-01-20 NOTE — PROGRESS NOTE ADULT - SUBJECTIVE AND OBJECTIVE BOX
Patient evaluated at bedside this morning, resting comfortably in bed, no acute events overnight.  She reports pain is well controlled with tylenol and motrin. Reports continued pubic symphasis pain and difficulty with ambulation.   She denies headache, dizziness, chest pain, palpitations, shortness of breath, nausea, vomiting, heavy vaginal bleeding or perineal discomfort. Reports decrease in amount of vaginal bleeding and denies clots.  She has been voiding spontaneously, and is breastfeeding.   Tolerating food well, without nausea/vomiting.  Passing flatus.     Physical Exam:  T(C): 36.7 (01-20-23 @ 06:06), Max: 36.7 (01-19-23 @ 22:28)  HR: 83 (01-20-23 @ 09:00) (57 - 85)  BP: 102/66 (01-20-23 @ 09:00) (87/54 - 110/55)  RR: 18 (01-20-23 @ 09:00) (14 - 28)  SpO2: 99% (01-20-23 @ 09:00) (97% - 100%)    GA: NAD  Resp: breathing comfortably on room air  Abd: soft, nontender, nondistended, no rebound or guarding, uterus firm at midline and below umbilicus  Perineum: normal lochia  Extremities: no swelling or calf tenderness                            8.9    10.51 )-----------( 192      ( 20 Jan 2023 05:21 )             27.5     01-20    134<L>  |  102  |  10  ----------------------------<  85  3.9   |  25  |  0.73    Ca    7.9<L>      20 Jan 2023 05:21  Phos  5.6     01-20  Mg     1.8     01-20    TPro  4.9<L>  /  Alb  2.6<L>  /  TBili  0.2  /  DBili  x   /  AST  19  /  ALT  10  /  AlkPhos  86  01-20    acetaminophen     Tablet .. 975 milliGRAM(s) Oral every 6 hours  chlorhexidine 2% Cloths 1 Application(s) Topical <User Schedule>  dibucaine 1% Ointment 1 Application(s) Topical every 6 hours PRN  diphtheria/tetanus/pertussis (acellular) Vaccine (Adacel) 0.5 milliLiter(s) IntraMuscular once  enoxaparin Injectable 40 milliGRAM(s) SubCutaneous every 24 hours  hydrocortisone 1% Cream 1 Application(s) Topical every 6 hours PRN  ibuprofen  Tablet. 600 milliGRAM(s) Oral every 6 hours  lanolin Ointment 1 Application(s) Topical every 6 hours PRN  oxyCODONE    IR 5 milliGRAM(s) Oral every 4 hours PRN  piperacillin/tazobactam IVPB.. 4.5 Gram(s) IV Intermittent every 8 hours  polyethylene glycol 3350 17 Gram(s) Oral daily  pramoxine 1%/zinc 5% Cream 1 Application(s) Topical every 4 hours PRN  prenatal multivitamin 1 Tablet(s) Oral daily  senna 2 Tablet(s) Oral at bedtime  simethicone 80 milliGRAM(s) Chew every 4 hours PRN  sodium chloride 0.9% lock flush 3 milliLiter(s) IV Push every 8 hours  witch hazel Pads 1 Application(s) Topical every 4 hours PRN

## 2023-01-20 NOTE — PROGRESS NOTE ADULT - ASSESSMENT
31 yo F with no known PMHx presents to [facility] on 1/16 for scheduled induction of labor, found to be febrile and persistently hypotensive s/p artificial rupture of membranes and delivery of fetus. Transferred to MICU for septic shock 2/2 presumed chorioaminitis. ID consulted for antibiotic recommendations. Lactate cleared, pressor requirements decreasing. Patient non-toxic and well appearing. WBC decreasing, bandemia resolved. BCx NGTD    Recommend:  - c/w zosyn 4.5g q8 for total 7 day course until 1/23  - if patient wishes to go home over the weekend can consider switching to cefpodoxime and PO flagyl but she will not be able to breastfeed while on flagyl    ID will sign off. Please re-consult as needed.  Recommendations not final until attending attestation present.    31 yo F with no known PMHx presents to [facility] on 1/16 for scheduled induction of labor, found to be febrile and persistently hypotensive s/p artificial rupture of membranes and delivery of fetus. Transferred to MICU for septic shock 2/2 presumed chorioaminitis. ID consulted for antibiotic recommendations. Lactate cleared, pressor requirements decreasing. Patient non-toxic and well appearing. WBC decreasing, bandemia resolved. BCx NGTD    Recommend:  - c/w zosyn 4.5g q8h for total 7 day course until 1/23      ID will sign off. Please re-consult as needed.  Recommendations not final until attending attestation present.

## 2023-01-21 LAB
BLD GP AB SCN SERPL QL: NEGATIVE — SIGNIFICANT CHANGE UP
RH IG SCN BLD-IMP: POSITIVE — SIGNIFICANT CHANGE UP

## 2023-01-21 RX ADMIN — Medication 975 MILLIGRAM(S): at 07:58

## 2023-01-21 RX ADMIN — Medication 975 MILLIGRAM(S): at 15:05

## 2023-01-21 RX ADMIN — Medication 975 MILLIGRAM(S): at 06:07

## 2023-01-21 RX ADMIN — Medication 600 MILLIGRAM(S): at 00:36

## 2023-01-21 RX ADMIN — PIPERACILLIN AND TAZOBACTAM 25 GRAM(S): 4; .5 INJECTION, POWDER, LYOPHILIZED, FOR SOLUTION INTRAVENOUS at 21:46

## 2023-01-21 RX ADMIN — PIPERACILLIN AND TAZOBACTAM 25 GRAM(S): 4; .5 INJECTION, POWDER, LYOPHILIZED, FOR SOLUTION INTRAVENOUS at 15:06

## 2023-01-21 RX ADMIN — Medication 600 MILLIGRAM(S): at 06:10

## 2023-01-21 RX ADMIN — SODIUM CHLORIDE 3 MILLILITER(S): 9 INJECTION INTRAMUSCULAR; INTRAVENOUS; SUBCUTANEOUS at 06:10

## 2023-01-21 RX ADMIN — PIPERACILLIN AND TAZOBACTAM 25 GRAM(S): 4; .5 INJECTION, POWDER, LYOPHILIZED, FOR SOLUTION INTRAVENOUS at 05:54

## 2023-01-21 RX ADMIN — Medication 975 MILLIGRAM(S): at 02:13

## 2023-01-21 RX ADMIN — Medication 600 MILLIGRAM(S): at 13:06

## 2023-01-21 RX ADMIN — Medication 600 MILLIGRAM(S): at 02:08

## 2023-01-21 RX ADMIN — Medication 975 MILLIGRAM(S): at 21:46

## 2023-01-21 RX ADMIN — Medication 600 MILLIGRAM(S): at 18:40

## 2023-01-21 RX ADMIN — Medication 975 MILLIGRAM(S): at 06:08

## 2023-01-21 RX ADMIN — Medication 1 TABLET(S): at 13:06

## 2023-01-21 RX ADMIN — SODIUM CHLORIDE 3 MILLILITER(S): 9 INJECTION INTRAMUSCULAR; INTRAVENOUS; SUBCUTANEOUS at 18:16

## 2023-01-21 RX ADMIN — Medication 600 MILLIGRAM(S): at 05:53

## 2023-01-21 RX ADMIN — ENOXAPARIN SODIUM 40 MILLIGRAM(S): 100 INJECTION SUBCUTANEOUS at 05:25

## 2023-01-21 NOTE — PROGRESS NOTE ADULT - ASSESSMENT
A/P 32y s/p FAVD, PPD #4 c/b chorioamnionitis and pubic symphasis diastasis, s/p MICU admission for hypotension likely due to septic status requiring pressors. Afebrile since 1/17 15:00.   - Step down from MICU yesterday to PP unit  - Pubic symphasis pain:          -Pelvic Xray 1/19: Pubic symphysis diastases, bony fragment. Intact hip joints.          - Ambulating with walker         -PT today  - Neuro: pain control with Motrin 600mg q6h and Tylenol 975mg q6h, Oxy 5mg q4h PRN  - CV: normotensive   - GI: Tolerating regular diet; consider adding on bowel regimen   - : voiding on her own, continue to follow  - Heme: monitor WBC and electrolytes and replete as needed.   - ID: Zosyn q8h until 1/23  - DVT prophylaxis: Lovenox 40mg qd, SCDs    Please page OB team for any question or concern at [phone].

## 2023-01-21 NOTE — PROGRESS NOTE ADULT - SUBJECTIVE AND OBJECTIVE BOX
Patient evaluated at bedside this morning, resting comfortably in bed, no acute events overnight.  She reports pain is well controlled with tylenol and motrin. She reports continued pubic symphysis pain.   She denies headache, dizziness, chest pain, palpitations, shortness of breath, nausea, vomiting, heavy vaginal bleeding or perineal discomfort. Reports decrease in amount of vaginal bleeding and denies clots.  She has been ambulating with walker, voiding spontaneously, and is breastfeeding.   Tolerating food well, without nausea/vomiting.  Passing flatus.     Physical Exam:  T(C): 36.4 (01-21-23 @ 06:00), Max: 36.5 (01-21-23 @ 02:00)  HR: 65 (01-21-23 @ 06:00) (65 - 75)  BP: 98/62 (01-21-23 @ 06:00) (98/62 - 105/67)  RR: 16 (01-21-23 @ 06:00) (16 - 18)  SpO2: 98% (01-21-23 @ 06:00) (97% - 99%)    GA: NAD  Resp: breathing comfortably on room air  Abd: soft, nontender, nondistended, no rebound or guarding, uterus firm at midline and below umbilicus  Perineum: normal lochia  Extremities: no swelling or calf tenderness                            8.9    10.51 )-----------( 192      ( 20 Jan 2023 05:21 )             27.5     01-20    134<L>  |  102  |  10  ----------------------------<  85  3.9   |  25  |  0.73    Ca    7.9<L>      20 Jan 2023 05:21  Phos  5.6     01-20  Mg     1.8     01-20    TPro  4.9<L>  /  Alb  2.6<L>  /  TBili  0.2  /  DBili  x   /  AST  19  /  ALT  10  /  AlkPhos  86  01-20    acetaminophen     Tablet .. 975 milliGRAM(s) Oral every 6 hours  dibucaine 1% Ointment 1 Application(s) Topical every 6 hours PRN  diphtheria/tetanus/pertussis (acellular) Vaccine (Adacel) 0.5 milliLiter(s) IntraMuscular once  enoxaparin Injectable 40 milliGRAM(s) SubCutaneous every 24 hours  hydrocortisone 1% Cream 1 Application(s) Topical every 6 hours PRN  ibuprofen  Tablet. 600 milliGRAM(s) Oral every 6 hours  lanolin Ointment 1 Application(s) Topical every 6 hours PRN  oxyCODONE    IR 5 milliGRAM(s) Oral every 4 hours PRN  piperacillin/tazobactam IVPB.. 4.5 Gram(s) IV Intermittent every 8 hours  polyethylene glycol 3350 17 Gram(s) Oral daily  pramoxine 1%/zinc 5% Cream 1 Application(s) Topical every 4 hours PRN  prenatal multivitamin 1 Tablet(s) Oral daily  senna 2 Tablet(s) Oral at bedtime  simethicone 80 milliGRAM(s) Chew every 4 hours PRN  sodium chloride 0.9% lock flush 3 milliLiter(s) IV Push every 8 hours  witch hazel Pads 1 Application(s) Topical every 4 hours PRN

## 2023-01-22 LAB
CULTURE RESULTS: SIGNIFICANT CHANGE UP
CULTURE RESULTS: SIGNIFICANT CHANGE UP
SPECIMEN SOURCE: SIGNIFICANT CHANGE UP
SPECIMEN SOURCE: SIGNIFICANT CHANGE UP

## 2023-01-22 RX ADMIN — SODIUM CHLORIDE 3 MILLILITER(S): 9 INJECTION INTRAMUSCULAR; INTRAVENOUS; SUBCUTANEOUS at 01:09

## 2023-01-22 RX ADMIN — PIPERACILLIN AND TAZOBACTAM 25 GRAM(S): 4; .5 INJECTION, POWDER, LYOPHILIZED, FOR SOLUTION INTRAVENOUS at 05:41

## 2023-01-22 RX ADMIN — POLYETHYLENE GLYCOL 3350 17 GRAM(S): 17 POWDER, FOR SOLUTION ORAL at 12:20

## 2023-01-22 RX ADMIN — Medication 975 MILLIGRAM(S): at 08:55

## 2023-01-22 RX ADMIN — PIPERACILLIN AND TAZOBACTAM 25 GRAM(S): 4; .5 INJECTION, POWDER, LYOPHILIZED, FOR SOLUTION INTRAVENOUS at 22:12

## 2023-01-22 RX ADMIN — Medication 600 MILLIGRAM(S): at 05:41

## 2023-01-22 RX ADMIN — Medication 975 MILLIGRAM(S): at 21:13

## 2023-01-22 RX ADMIN — Medication 975 MILLIGRAM(S): at 15:24

## 2023-01-22 RX ADMIN — Medication 600 MILLIGRAM(S): at 12:20

## 2023-01-22 RX ADMIN — Medication 600 MILLIGRAM(S): at 23:38

## 2023-01-22 RX ADMIN — ENOXAPARIN SODIUM 40 MILLIGRAM(S): 100 INJECTION SUBCUTANEOUS at 05:42

## 2023-01-22 RX ADMIN — Medication 600 MILLIGRAM(S): at 18:03

## 2023-01-22 RX ADMIN — PIPERACILLIN AND TAZOBACTAM 25 GRAM(S): 4; .5 INJECTION, POWDER, LYOPHILIZED, FOR SOLUTION INTRAVENOUS at 14:09

## 2023-01-22 RX ADMIN — Medication 1 TABLET(S): at 12:20

## 2023-01-22 RX ADMIN — SODIUM CHLORIDE 3 MILLILITER(S): 9 INJECTION INTRAMUSCULAR; INTRAVENOUS; SUBCUTANEOUS at 05:32

## 2023-01-22 RX ADMIN — Medication 975 MILLIGRAM(S): at 22:00

## 2023-01-22 NOTE — PROGRESS NOTE ADULT - SUBJECTIVE AND OBJECTIVE BOX
Patient evaluated at bedside this morning, resting comfortably in bed, no acute events overnight.  She reports pain is well controlled with tylenol and motrin.  Reports continued pubic symphysis pain. Ambulating with walker.   She denies headache, dizziness, chest pain, palpitations, shortness of breath, nausea, vomiting, heavy vaginal bleeding or perineal discomfort. Reports decrease in amount of vaginal bleeding and denies clots.  She has been voiding spontaneously, and is breastfeeding.   Tolerating food well, without nausea/vomiting.  Passing flatus.     Physical Exam:  T(C): 36.4 (01-22-23 @ 05:25), Max: 36.8 (01-21-23 @ 23:00)  HR: 75 (01-22-23 @ 05:25) (74 - 81)  BP: 102/60 (01-22-23 @ 05:25) (99/66 - 110/70)  RR: 19 (01-22-23 @ 05:25) (19 - 19)  SpO2: 98% (01-22-23 @ 05:25) (96% - 98%)    GA: NAD  Resp: breathing comfortably on room air  Abd: soft, nontender, nondistended, no rebound or guarding, uterus firm at midline and below umbilicus  Perineum: normal lochia  Extremities: no swelling or calf tenderness              acetaminophen     Tablet .. 975 milliGRAM(s) Oral every 6 hours  dibucaine 1% Ointment 1 Application(s) Topical every 6 hours PRN  diphtheria/tetanus/pertussis (acellular) Vaccine (Adacel) 0.5 milliLiter(s) IntraMuscular once  enoxaparin Injectable 40 milliGRAM(s) SubCutaneous every 24 hours  hydrocortisone 1% Cream 1 Application(s) Topical every 6 hours PRN  ibuprofen  Tablet. 600 milliGRAM(s) Oral every 6 hours  lanolin Ointment 1 Application(s) Topical every 6 hours PRN  oxyCODONE    IR 5 milliGRAM(s) Oral every 4 hours PRN  piperacillin/tazobactam IVPB.. 4.5 Gram(s) IV Intermittent every 8 hours  polyethylene glycol 3350 17 Gram(s) Oral daily  pramoxine 1%/zinc 5% Cream 1 Application(s) Topical every 4 hours PRN  prenatal multivitamin 1 Tablet(s) Oral daily  senna 2 Tablet(s) Oral at bedtime  simethicone 80 milliGRAM(s) Chew every 4 hours PRN  sodium chloride 0.9% lock flush 3 milliLiter(s) IV Push every 8 hours  witch hazel Pads 1 Application(s) Topical every 4 hours PRN

## 2023-01-23 ENCOUNTER — TRANSCRIPTION ENCOUNTER (OUTPATIENT)
Age: 33
End: 2023-01-23

## 2023-01-23 VITALS
RESPIRATION RATE: 18 BRPM | HEART RATE: 86 BPM | TEMPERATURE: 97 F | DIASTOLIC BLOOD PRESSURE: 89 MMHG | OXYGEN SATURATION: 97 % | SYSTOLIC BLOOD PRESSURE: 135 MMHG

## 2023-01-23 LAB
HCT VFR BLD CALC: 31.9 % — LOW (ref 34.5–45)
HGB BLD-MCNC: 10.1 G/DL — LOW (ref 11.5–15.5)
MCHC RBC-ENTMCNC: 27.7 PG — SIGNIFICANT CHANGE UP (ref 27–34)
MCHC RBC-ENTMCNC: 31.7 GM/DL — LOW (ref 32–36)
MCV RBC AUTO: 87.4 FL — SIGNIFICANT CHANGE UP (ref 80–100)
NRBC # BLD: 0 /100 WBCS — SIGNIFICANT CHANGE UP (ref 0–0)
PLATELET # BLD AUTO: 288 K/UL — SIGNIFICANT CHANGE UP (ref 150–400)
RBC # BLD: 3.65 M/UL — LOW (ref 3.8–5.2)
RBC # FLD: 13.2 % — SIGNIFICANT CHANGE UP (ref 10.3–14.5)
WBC # BLD: 9.18 K/UL — SIGNIFICANT CHANGE UP (ref 3.8–10.5)
WBC # FLD AUTO: 9.18 K/UL — SIGNIFICANT CHANGE UP (ref 3.8–10.5)

## 2023-01-23 PROCEDURE — 84300 ASSAY OF URINE SODIUM: CPT

## 2023-01-23 PROCEDURE — 87635 SARS-COV-2 COVID-19 AMP PRB: CPT

## 2023-01-23 PROCEDURE — 59050 FETAL MONITOR W/REPORT: CPT

## 2023-01-23 PROCEDURE — 86900 BLOOD TYPING SEROLOGIC ABO: CPT

## 2023-01-23 PROCEDURE — 83605 ASSAY OF LACTIC ACID: CPT

## 2023-01-23 PROCEDURE — 86901 BLOOD TYPING SEROLOGIC RH(D): CPT

## 2023-01-23 PROCEDURE — 86850 RBC ANTIBODY SCREEN: CPT

## 2023-01-23 PROCEDURE — 83935 ASSAY OF URINE OSMOLALITY: CPT

## 2023-01-23 PROCEDURE — 85025 COMPLETE CBC W/AUTO DIFF WBC: CPT

## 2023-01-23 PROCEDURE — 85730 THROMBOPLASTIN TIME PARTIAL: CPT

## 2023-01-23 PROCEDURE — 72190 X-RAY EXAM OF PELVIS: CPT

## 2023-01-23 PROCEDURE — 85384 FIBRINOGEN ACTIVITY: CPT

## 2023-01-23 PROCEDURE — 88307 TISSUE EXAM BY PATHOLOGIST: CPT

## 2023-01-23 PROCEDURE — 82962 GLUCOSE BLOOD TEST: CPT

## 2023-01-23 PROCEDURE — 97161 PT EVAL LOW COMPLEX 20 MIN: CPT

## 2023-01-23 PROCEDURE — 83735 ASSAY OF MAGNESIUM: CPT

## 2023-01-23 PROCEDURE — 59025 FETAL NON-STRESS TEST: CPT

## 2023-01-23 PROCEDURE — 86780 TREPONEMA PALLIDUM: CPT

## 2023-01-23 PROCEDURE — 84100 ASSAY OF PHOSPHORUS: CPT

## 2023-01-23 PROCEDURE — 80048 BASIC METABOLIC PNL TOTAL CA: CPT

## 2023-01-23 PROCEDURE — 85027 COMPLETE CBC AUTOMATED: CPT

## 2023-01-23 PROCEDURE — 36415 COLL VENOUS BLD VENIPUNCTURE: CPT

## 2023-01-23 PROCEDURE — 87040 BLOOD CULTURE FOR BACTERIA: CPT

## 2023-01-23 PROCEDURE — 85610 PROTHROMBIN TIME: CPT

## 2023-01-23 PROCEDURE — 86769 SARS-COV-2 COVID-19 ANTIBODY: CPT

## 2023-01-23 PROCEDURE — 81001 URINALYSIS AUTO W/SCOPE: CPT

## 2023-01-23 PROCEDURE — 80053 COMPREHEN METABOLIC PANEL: CPT

## 2023-01-23 RX ORDER — ACETAMINOPHEN 500 MG
3 TABLET ORAL
Qty: 0 | Refills: 0 | DISCHARGE
Start: 2023-01-23

## 2023-01-23 RX ORDER — IBUPROFEN 200 MG
1 TABLET ORAL
Qty: 0 | Refills: 0 | DISCHARGE
Start: 2023-01-23

## 2023-01-23 RX ADMIN — Medication 600 MILLIGRAM(S): at 12:07

## 2023-01-23 RX ADMIN — ENOXAPARIN SODIUM 40 MILLIGRAM(S): 100 INJECTION SUBCUTANEOUS at 05:33

## 2023-01-23 RX ADMIN — Medication 600 MILLIGRAM(S): at 05:33

## 2023-01-23 RX ADMIN — Medication 975 MILLIGRAM(S): at 10:01

## 2023-01-23 RX ADMIN — Medication 975 MILLIGRAM(S): at 14:51

## 2023-01-23 RX ADMIN — Medication 1 TABLET(S): at 12:07

## 2023-01-23 RX ADMIN — PIPERACILLIN AND TAZOBACTAM 25 GRAM(S): 4; .5 INJECTION, POWDER, LYOPHILIZED, FOR SOLUTION INTRAVENOUS at 05:33

## 2023-01-23 NOTE — DISCHARGE NOTE OB - CARE PROVIDER_API CALL
Alex Novak  OBSTETRICS AND GYNECOLOGY  [address]  [place] [address]  Phone: [phone]  Fax: [phone]  Follow Up Time:

## 2023-01-23 NOTE — DISCHARGE NOTE OB - NS MD DC FALL RISK RISK
For information on Fall & Injury Prevention, visit: [email or web address] OR  [email or web address] OR  https://www.cdc.gov/steadi/patient.html

## 2023-01-23 NOTE — DISCHARGE NOTE OB - PATIENT PORTAL LINK FT
You can access the FollowMyHealth Patient Portal offered by [facility] by registering at the following website: [email or web address]. By joining [facility]’s FollowMyHealth portal, you will also be able to view your health information using other applications (apps) compatible with our system.

## 2023-01-23 NOTE — PROGRESS NOTE ADULT - ATTENDING COMMENTS
I saw and evaluated the patient. Ambulating without difficulty. Plan to discharge home after she is cleared by ID.
Pt seen and evaluated with Dr Vargas.   Agree with above.   f/u Blood cx.
Septic shock secondary to chorioamnionitis, s/p episiotomy , post partum. Levophed was d/c today after fluid bolus. Monitor overnight in ICU. Rest as above.
Pt seen at bedside  31yo now P1 PPD#3 FAVD for NRFHT and chorio. Postpartum complicated with septic shock required pressor in ICU till yesterday afternoon. Since pressor came off she has been maintaining her MAP well in 70's. Afebrile. Still on Zosyn. Clinda Dc'd yesterday.  Pt moves to bedside comod slowly by backwords due to pain. She lives on 4th floor of apartment without elevator.   She is single mother and her mother will stay with her till February.   Discussed above with PT Mireille. Continue PT while in house.   ID recommended IV zosyn regimen vs PO regimen with cepharosporin plus MNZ.  Breast feeding only allowed with zosyn.   With all above considered, Pt chose to continue inpatient Abx management and be discharged on Monday am.   200mg IV iron sucrose x 1  CBC prior to discharge on Monday 5am
Full term, vaginal, forceps assisted delivery c/b septic shock due to chorioamnionitis. Cultures pending. Bandemia improving. C/w Clindamycin for another 48 hours and will then stop; finish Zosyn course.
Full term, vaginal, forceps assisted delivery c/b septic shock due to chorioamnionitis. Cultures remain sterile. Shock resolved; bandemia resolved. Finish Zosyn course; stop Clindamycin. Eligible for step down to OB floor.

## 2023-01-23 NOTE — PROGRESS NOTE ADULT - ASSESSMENT
A/P 32y s/p  c/b MICU admission for suspected septic shock, PPD#6, stable, meeting postpartum milestones   - Suspected septic shock: S/p MICU admission, hemodynamically stable. On zosyn 4.5 q8h, afebrile overnight. Resolved lactic acidosis.  - Pain: well controlled on tylenol/motrin  - GI: Tolerating regular diet  - : urinating without difficulty/pain  - DVT prophylaxis: ambulating frequently  - Dispo: will discuss outpatient antibiotic regimen today with infectious diseases

## 2023-01-23 NOTE — DISCHARGE NOTE OB - PLAN OF CARE
Take Motrin 600mg every 6 hours and/or tylenol 650mg every 6 hours as needed for pain. Call your OB to schedule a follow up appointment in 6 weeks. Nothing per vagina until cleared by your OB - no intercourse, douching, tampons, etc.  Call your OB if you experience severe abdominal pain not improved by oral pain medications, heavy bright red vaginal bleeding saturating more than 1 pad per hour, or fever greater than 100.4F. Consider contraception options to be discussed with your OB. You were evaluated by physical therapy and provided with recommendations to improve comfort and mobility once you get home. After delivery, your were hypotensive requiring pharmacologic BP support, with an elevated lactate. In the setting of chorioamnionitis you were suspected to have possible septic shock. You were treated with IV antibiotics and admitted to the MICU on pressors. Infectious disease was consulted and you were treated appropriately with IV Zosyn (after being treated empirically with IV ampicillin, gentamicin, and clindamycin). On day of discharge you were afebrile and without signs/symptoms of infection. You were stepped down from the MICU once you were weaned off pressors.

## 2023-01-23 NOTE — DISCHARGE NOTE OB - HOSPITAL COURSE
Pt is a 32yF s/p  c/b MICU admission for suspected septic shock. Please see delivery note for details. Patient was admitted to MICU postpartum given elevated lactate and hypotension requiring pressors support. Patient was treated first empirically with IV ampicillin/gentamicin/clindamycin given suspected chorioamnionitis and then transitioned to IV Zosyn per infectious disease recommendations. Patient completed recommended course of IV Zosyn and was stepped down to postpartum floor after weaned off levophed.  Patient received recommendations from PT given suspected pubic symphysis diastasis.  On day of discharge patient was ambulating, pt had adequate oral intake, and was voiding freely. Vitals were stable, vaginal bleeding appropriate, and pain well controlled.  Discharge instructions and precautions were given.  Will return to clinic in 4 weeks for postpartum visit.

## 2023-01-23 NOTE — DISCHARGE NOTE OB - CARE PLAN
1 Principal Discharge DX:	Postpartum state  Assessment and plan of treatment:	Take Motrin 600mg every 6 hours and/or tylenol 650mg every 6 hours as needed for pain. Call your OB to schedule a follow up appointment in 6 weeks. Nothing per vagina until cleared by your OB - no intercourse, douching, tampons, etc.  Call your OB if you experience severe abdominal pain not improved by oral pain medications, heavy bright red vaginal bleeding saturating more than 1 pad per hour, or fever greater than 100.4F. Consider contraception options to be discussed with your OB.  Secondary Diagnosis:	Septic shock  Assessment and plan of treatment:	After delivery, your were hypotensive requiring pharmacologic BP support, with an elevated lactate. In the setting of chorioamnionitis you were suspected to have possible septic shock. You were treated with IV antibiotics and admitted to the MICU on pressors. Infectious disease was consulted and you were treated appropriately with IV Zosyn (after being treated empirically with IV ampicillin, gentamicin, and clindamycin). On day of discharge you were afebrile and without signs/symptoms of infection. You were stepped down from the MICU once you were weaned off pressors.  Secondary Diagnosis:	Diastasis of symphysis pubis during delivery  Assessment and plan of treatment:	You were evaluated by physical therapy and provided with recommendations to improve comfort and mobility once you get home.

## 2023-01-23 NOTE — PROGRESS NOTE ADULT - SUBJECTIVE AND OBJECTIVE BOX
Patient evaluated at bedside this morning, resting comfortably in bed, no acute events overnight.  She reports pain is well controlled with tylenol and motrin.  Reports decrease in amount of vaginal bleeding.  She has been ambulating without assistance, voiding spontaneously.  Tolerating food well, without nausea/vomit.    Denies lightheadedness, dizziness, fevers/chills.    Physical Exam:  T(C): 36.6 (01-23-23 @ 06:00), Max: 36.6 (01-22-23 @ 22:00)  HR: 69 (01-23-23 @ 06:00) (69 - 89)  BP: 94/60 (01-23-23 @ 06:00) (94/60 - 107/71)  RR: 18 (01-23-23 @ 06:00) (16 - 18)  SpO2: 98% (01-23-23 @ 06:00) (96% - 99%)    GA: NAD, patient is alert and oriented  Resp: No increased work of breathing, breathing comfortably on RA  Abd: Soft, nontender, nondistended, no rebound or guarding, uterus firm.  Extremities: no swelling or calf tenderness                          10.1   9.18  )-----------( 288      ( 23 Jan 2023 06:50 )             31.9           acetaminophen     Tablet .. 975 milliGRAM(s) Oral every 6 hours  dibucaine 1% Ointment 1 Application(s) Topical every 6 hours PRN  diphtheria/tetanus/pertussis (acellular) Vaccine (Adacel) 0.5 milliLiter(s) IntraMuscular once  enoxaparin Injectable 40 milliGRAM(s) SubCutaneous every 24 hours  hydrocortisone 1% Cream 1 Application(s) Topical every 6 hours PRN  ibuprofen  Tablet. 600 milliGRAM(s) Oral every 6 hours  lanolin Ointment 1 Application(s) Topical every 6 hours PRN  oxyCODONE    IR 5 milliGRAM(s) Oral every 4 hours PRN  polyethylene glycol 3350 17 Gram(s) Oral daily  pramoxine 1%/zinc 5% Cream 1 Application(s) Topical every 4 hours PRN  prenatal multivitamin 1 Tablet(s) Oral daily  senna 2 Tablet(s) Oral at bedtime  simethicone 80 milliGRAM(s) Chew every 4 hours PRN  sodium chloride 0.9% lock flush 3 milliLiter(s) IV Push every 8 hours  witch hazel Pads 1 Application(s) Topical every 4 hours PRN

## 2023-01-24 LAB — SURGICAL PATHOLOGY STUDY: SIGNIFICANT CHANGE UP

## 2023-01-27 DIAGNOSIS — E83.42 HYPOMAGNESEMIA: ICD-10-CM

## 2023-01-27 DIAGNOSIS — Z3A.40 40 WEEKS GESTATION OF PREGNANCY: ICD-10-CM

## 2023-01-27 DIAGNOSIS — K83.1 OBSTRUCTION OF BILE DUCT: ICD-10-CM

## 2023-01-27 DIAGNOSIS — R65.21 SEVERE SEPSIS WITH SEPTIC SHOCK: ICD-10-CM

## 2023-01-27 DIAGNOSIS — M62.08 SEPARATION OF MUSCLE (NONTRAUMATIC), OTHER SITE: ICD-10-CM

## 2023-01-27 DIAGNOSIS — O41.1230 CHORIOAMNIONITIS, THIRD TRIMESTER, NOT APPLICABLE OR UNSPECIFIED: ICD-10-CM

## 2023-01-27 DIAGNOSIS — O48.0 POST-TERM PREGNANCY: ICD-10-CM

## 2023-01-27 DIAGNOSIS — E22.2 SYNDROME OF INAPPROPRIATE SECRETION OF ANTIDIURETIC HORMONE: ICD-10-CM

## 2023-01-27 DIAGNOSIS — I95.9 HYPOTENSION, UNSPECIFIED: ICD-10-CM

## 2023-01-27 DIAGNOSIS — D72.825 BANDEMIA: ICD-10-CM

## 2023-01-27 DIAGNOSIS — A41.9 SEPSIS, UNSPECIFIED ORGANISM: ICD-10-CM
